# Patient Record
Sex: FEMALE | NOT HISPANIC OR LATINO | URBAN - METROPOLITAN AREA
[De-identification: names, ages, dates, MRNs, and addresses within clinical notes are randomized per-mention and may not be internally consistent; named-entity substitution may affect disease eponyms.]

---

## 2020-04-27 ENCOUNTER — INPATIENT (INPATIENT)
Facility: HOSPITAL | Age: 60
LOS: 2 days | Discharge: HOME HEALTH | End: 2020-04-30
Attending: HOSPITALIST | Admitting: HOSPITALIST
Payer: COMMERCIAL

## 2020-04-27 VITALS
OXYGEN SATURATION: 95 % | DIASTOLIC BLOOD PRESSURE: 56 MMHG | RESPIRATION RATE: 22 BRPM | SYSTOLIC BLOOD PRESSURE: 122 MMHG | TEMPERATURE: 98 F | HEART RATE: 89 BPM

## 2020-04-27 DIAGNOSIS — E87.6 HYPOKALEMIA: ICD-10-CM

## 2020-04-27 DIAGNOSIS — Z98.891 HISTORY OF UTERINE SCAR FROM PREVIOUS SURGERY: Chronic | ICD-10-CM

## 2020-04-27 DIAGNOSIS — Z29.9 ENCOUNTER FOR PROPHYLACTIC MEASURES, UNSPECIFIED: ICD-10-CM

## 2020-04-27 DIAGNOSIS — R68.89 OTHER GENERAL SYMPTOMS AND SIGNS: ICD-10-CM

## 2020-04-27 DIAGNOSIS — R74.0 NONSPECIFIC ELEVATION OF LEVELS OF TRANSAMINASE AND LACTIC ACID DEHYDROGENASE [LDH]: ICD-10-CM

## 2020-04-27 DIAGNOSIS — Z02.9 ENCOUNTER FOR ADMINISTRATIVE EXAMINATIONS, UNSPECIFIED: ICD-10-CM

## 2020-04-27 DIAGNOSIS — J18.9 PNEUMONIA, UNSPECIFIED ORGANISM: ICD-10-CM

## 2020-04-27 DIAGNOSIS — J96.91 RESPIRATORY FAILURE, UNSPECIFIED WITH HYPOXIA: ICD-10-CM

## 2020-04-27 LAB
ALBUMIN SERPL ELPH-MCNC: 3.5 G/DL — SIGNIFICANT CHANGE UP (ref 3.3–5)
ALP SERPL-CCNC: 59 U/L — SIGNIFICANT CHANGE UP (ref 40–120)
ALT FLD-CCNC: 43 U/L — HIGH (ref 4–33)
ANION GAP SERPL CALC-SCNC: 16 MMO/L — HIGH (ref 7–14)
ANISOCYTOSIS BLD QL: SLIGHT — SIGNIFICANT CHANGE UP
APTT BLD: 29.8 SEC — SIGNIFICANT CHANGE UP (ref 27.5–36.3)
AST SERPL-CCNC: 40 U/L — HIGH (ref 4–32)
BASE EXCESS BLDV CALC-SCNC: 2.8 MMOL/L — SIGNIFICANT CHANGE UP
BASOPHILS # BLD AUTO: 0.01 K/UL — SIGNIFICANT CHANGE UP (ref 0–0.2)
BASOPHILS NFR BLD AUTO: 0.2 % — SIGNIFICANT CHANGE UP (ref 0–2)
BASOPHILS NFR SPEC: 0 % — SIGNIFICANT CHANGE UP (ref 0–2)
BILIRUB SERPL-MCNC: 0.7 MG/DL — SIGNIFICANT CHANGE UP (ref 0.2–1.2)
BLASTS # FLD: 0 % — SIGNIFICANT CHANGE UP (ref 0–0)
BLOOD GAS VENOUS - CREATININE: SIGNIFICANT CHANGE UP MG/DL (ref 0.5–1.3)
BUN SERPL-MCNC: 9 MG/DL — SIGNIFICANT CHANGE UP (ref 7–23)
CALCIUM SERPL-MCNC: 9.1 MG/DL — SIGNIFICANT CHANGE UP (ref 8.4–10.5)
CHLORIDE BLDV-SCNC: 102 MMOL/L — SIGNIFICANT CHANGE UP (ref 96–108)
CHLORIDE SERPL-SCNC: 98 MMOL/L — SIGNIFICANT CHANGE UP (ref 98–107)
CO2 SERPL-SCNC: 23 MMOL/L — SIGNIFICANT CHANGE UP (ref 22–31)
CREAT SERPL-MCNC: 0.49 MG/DL — LOW (ref 0.5–1.3)
CRP SERPL-MCNC: 112.8 MG/L — HIGH
D DIMER BLD IA.RAPID-MCNC: 279 NG/ML — SIGNIFICANT CHANGE UP
EOSINOPHIL # BLD AUTO: 0.01 K/UL — SIGNIFICANT CHANGE UP (ref 0–0.5)
EOSINOPHIL NFR BLD AUTO: 0.2 % — SIGNIFICANT CHANGE UP (ref 0–6)
EOSINOPHIL NFR FLD: 0 % — SIGNIFICANT CHANGE UP (ref 0–6)
FERRITIN SERPL-MCNC: 1204 NG/ML — HIGH (ref 15–150)
GAS PNL BLDV: 135 MMOL/L — LOW (ref 136–146)
GIANT PLATELETS BLD QL SMEAR: PRESENT — SIGNIFICANT CHANGE UP
GLUCOSE BLDV-MCNC: 114 MG/DL — HIGH (ref 70–99)
GLUCOSE SERPL-MCNC: 109 MG/DL — HIGH (ref 70–99)
HCO3 BLDV-SCNC: 26 MMOL/L — SIGNIFICANT CHANGE UP (ref 20–27)
HCT VFR BLD CALC: 37.1 % — SIGNIFICANT CHANGE UP (ref 34.5–45)
HCT VFR BLDV CALC: 41.6 % — SIGNIFICANT CHANGE UP (ref 34.5–45)
HGB BLD-MCNC: 12.7 G/DL — SIGNIFICANT CHANGE UP (ref 11.5–15.5)
HGB BLDV-MCNC: 13.5 G/DL — SIGNIFICANT CHANGE UP (ref 11.5–15.5)
IMM GRANULOCYTES NFR BLD AUTO: 0.5 % — SIGNIFICANT CHANGE UP (ref 0–1.5)
INR BLD: 1.42 — HIGH (ref 0.88–1.17)
LACTATE BLDV-MCNC: 2 MMOL/L — SIGNIFICANT CHANGE UP (ref 0.5–2)
LYMPHOCYTES # BLD AUTO: 0.96 K/UL — LOW (ref 1–3.3)
LYMPHOCYTES # BLD AUTO: 14.9 % — SIGNIFICANT CHANGE UP (ref 13–44)
LYMPHOCYTES NFR SPEC AUTO: 7.8 % — LOW (ref 13–44)
MCHC RBC-ENTMCNC: 29.1 PG — SIGNIFICANT CHANGE UP (ref 27–34)
MCHC RBC-ENTMCNC: 34.2 % — SIGNIFICANT CHANGE UP (ref 32–36)
MCV RBC AUTO: 84.9 FL — SIGNIFICANT CHANGE UP (ref 80–100)
METAMYELOCYTES # FLD: 0 % — SIGNIFICANT CHANGE UP (ref 0–1)
MONOCYTES # BLD AUTO: 0.49 K/UL — SIGNIFICANT CHANGE UP (ref 0–0.9)
MONOCYTES NFR BLD AUTO: 7.6 % — SIGNIFICANT CHANGE UP (ref 2–14)
MONOCYTES NFR BLD: 6.9 % — SIGNIFICANT CHANGE UP (ref 2–9)
MYELOCYTES NFR BLD: 0 % — SIGNIFICANT CHANGE UP (ref 0–0)
NEUTROPHIL AB SER-ACNC: 79.3 % — HIGH (ref 43–77)
NEUTROPHILS # BLD AUTO: 4.94 K/UL — SIGNIFICANT CHANGE UP (ref 1.8–7.4)
NEUTROPHILS NFR BLD AUTO: 76.6 % — SIGNIFICANT CHANGE UP (ref 43–77)
NEUTS BAND # BLD: 0 % — SIGNIFICANT CHANGE UP (ref 0–6)
NRBC # FLD: 0 K/UL — SIGNIFICANT CHANGE UP (ref 0–0)
OTHER - HEMATOLOGY %: 0 — SIGNIFICANT CHANGE UP
PCO2 BLDV: 38 MMHG — LOW (ref 41–51)
PH BLDV: 7.45 PH — HIGH (ref 7.32–7.43)
PLATELET # BLD AUTO: 376 K/UL — SIGNIFICANT CHANGE UP (ref 150–400)
PLATELET COUNT - ESTIMATE: NORMAL — SIGNIFICANT CHANGE UP
PMV BLD: 9.4 FL — SIGNIFICANT CHANGE UP (ref 7–13)
PO2 BLDV: 32 MMHG — LOW (ref 35–40)
POLYCHROMASIA BLD QL SMEAR: SLIGHT — SIGNIFICANT CHANGE UP
POTASSIUM BLDV-SCNC: 2.9 MMOL/L — CRITICAL LOW (ref 3.4–4.5)
POTASSIUM SERPL-MCNC: 3 MMOL/L — LOW (ref 3.5–5.3)
POTASSIUM SERPL-SCNC: 3 MMOL/L — LOW (ref 3.5–5.3)
PROCALCITONIN SERPL-MCNC: 0.1 NG/ML — SIGNIFICANT CHANGE UP (ref 0.02–0.1)
PROMYELOCYTES # FLD: 0 % — SIGNIFICANT CHANGE UP (ref 0–0)
PROT SERPL-MCNC: 7.6 G/DL — SIGNIFICANT CHANGE UP (ref 6–8.3)
PROTHROM AB SERPL-ACNC: 16.4 SEC — HIGH (ref 9.8–13.1)
RBC # BLD: 4.37 M/UL — SIGNIFICANT CHANGE UP (ref 3.8–5.2)
RBC # FLD: 12.7 % — SIGNIFICANT CHANGE UP (ref 10.3–14.5)
REVIEW TO FOLLOW: YES — SIGNIFICANT CHANGE UP
SAO2 % BLDV: 59.8 % — LOW (ref 60–85)
SARS-COV-2 RNA SPEC QL NAA+PROBE: DETECTED
SODIUM SERPL-SCNC: 137 MMOL/L — SIGNIFICANT CHANGE UP (ref 135–145)
VARIANT LYMPHS # BLD: 6 % — SIGNIFICANT CHANGE UP
WBC # BLD: 6.44 K/UL — SIGNIFICANT CHANGE UP (ref 3.8–10.5)
WBC # FLD AUTO: 6.44 K/UL — SIGNIFICANT CHANGE UP (ref 3.8–10.5)

## 2020-04-27 PROCEDURE — 71045 X-RAY EXAM CHEST 1 VIEW: CPT | Mod: 26

## 2020-04-27 PROCEDURE — 99232 SBSQ HOSP IP/OBS MODERATE 35: CPT | Mod: GC

## 2020-04-27 RX ORDER — LANOLIN ALCOHOL/MO/W.PET/CERES
3 CREAM (GRAM) TOPICAL AT BEDTIME
Refills: 0 | Status: DISCONTINUED | OUTPATIENT
Start: 2020-04-27 | End: 2020-04-30

## 2020-04-27 RX ORDER — POTASSIUM CHLORIDE 20 MEQ
40 PACKET (EA) ORAL ONCE
Refills: 0 | Status: COMPLETED | OUTPATIENT
Start: 2020-04-27 | End: 2020-04-27

## 2020-04-27 RX ORDER — ENOXAPARIN SODIUM 100 MG/ML
40 INJECTION SUBCUTANEOUS DAILY
Refills: 0 | Status: DISCONTINUED | OUTPATIENT
Start: 2020-04-27 | End: 2020-04-30

## 2020-04-27 RX ORDER — ALBUTEROL 90 UG/1
2 AEROSOL, METERED ORAL EVERY 6 HOURS
Refills: 0 | Status: DISCONTINUED | OUTPATIENT
Start: 2020-04-27 | End: 2020-04-30

## 2020-04-27 RX ORDER — ACETAMINOPHEN 500 MG
650 TABLET ORAL EVERY 6 HOURS
Refills: 0 | Status: DISCONTINUED | OUTPATIENT
Start: 2020-04-27 | End: 2020-04-30

## 2020-04-27 RX ORDER — POTASSIUM CHLORIDE 20 MEQ
20 PACKET (EA) ORAL ONCE
Refills: 0 | Status: COMPLETED | OUTPATIENT
Start: 2020-04-27 | End: 2020-04-27

## 2020-04-27 RX ADMIN — Medication 40 MILLIEQUIVALENT(S): at 18:58

## 2020-04-27 RX ADMIN — Medication 20 MILLIEQUIVALENT(S): at 21:17

## 2020-04-27 RX ADMIN — Medication 3 MILLIGRAM(S): at 22:05

## 2020-04-27 NOTE — ED ADULT NURSE NOTE - OBJECTIVE STATEMENT
60 year old female with no PHX, A&Ox4, ambulatory C/O fevers, cough, myalgias x 1 week, worsening SOB x few days. States  at home positive COVID 19. Denies N/V/D, CP. SpO2 93 % on room air at rest, ambulatory SpO2 88 % on room air. Md evaluated, VS as noted. 20 G IV placed to R AC, labs sent, COVID 19 swab sent. Call bell within reach, comfort measures provided, Repot given to primary area RN.

## 2020-04-27 NOTE — H&P ADULT - HISTORY OF PRESENT ILLNESS
Pt is a 61 yo F w/ no known PMHx presents with cough, SOB, and fever. Pt states she has had cough productive of white sputum and body aches x1 week. Pt checked SpO2 at home and saw it was 80% so she decided to come to the ED. Pt was swabbed for COVID 2 days ago. She denies CP, n/v, abdominal pain, diarrhea.     In ED:  VS: Temp 36.9, HR 89, /56 --> 95/48, RR 22-28, SpO2 95% on RA  Labs: auto lymphocyte # 0.96, K 3, VBG 7.45/38/32/26  Imaging: CXR R sided peripheral opacities  Interventions: K 40 meq Pt is a 61 yo F w/ no known PMHx presents with SOB and fever. Pt states that over a week ago she developed a generalized HA, dry throat, fatigue, and chills. This morning, she noticed she had worsening SOB and MORRISON. She checked her SpO2 on her pulse oximeter at home and saw it was 80% so she decided to come to the ED. A week ago, pt was prescribed azithromycin by her doctor and completed a course without improvement in symptoms. She had a CXR performed on Friday 4/24 which showed PNA and she was started on levaquin, again without improvement in sx. Pt was swabbed for COVID 2 days ago but the results are not back yet. Her  and son who live with her are COVID +. 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 She denies CP, n/v, abdominal pain, diarrhea.     In ED:  VS: Temp 36.9, HR 89, /56 --> 95/48, RR 22-28, SpO2 95% on RA. Per ED note, pt desaturated w/ ambulation   Labs: auto lymphocyte # 0.96, K 3, AST 40/ALT 43, VBG 7.45/38/32/26, ferritin 1204, .8, procal, d-dimer wnl             EKG: NSR;   Imaging: CXR R sided peripheral opacities  Interventions: K 40 meq Pt is a 61 yo F w/ no known PMHx presents with SOB and fever. Pt states that over a week ago she developed a generalized HA, dry throat, fatigue, and chills. This morning, she noticed she had worsening SOB and MORRISON. She checked her SpO2 on her pulse oximeter at home and saw it was 80% so she decided to come to the ED. A week ago, pt was prescribed azithromycin by her doctor and completed a course without improvement in symptoms. She had a CXR performed on Friday 4/24 which showed PNA and she was started on levaquin, again without improvement in sx. Pt was swabbed for COVID 2 days ago but the results are not back yet. Her  and son who live with her are COVID +. 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 She denies CP, n/v, abdominal pain, diarrhea.     In ED:  VS: Temp 36.9, HR 89, /56 --> 95/48, RR 22-28, SpO2 95% on RA. Per ED note, pt desaturated w/ ambulation   Labs: auto lymphocyte # 0.96, K 3, AST 40/ALT 43, VBG 7.45/38/32/26, ferritin 1204, .8, procal, d-dimer wnl. INR 1.4             EKG: NSR   Imaging: CXR R sided peripheral opacities  Interventions: K 40 meq

## 2020-04-27 NOTE — H&P ADULT - PROBLEM SELECTOR PLAN 1
CXR w/ b/l opacities c/w COVID PNA. Pt w/ known contacts.  - f/u COVID-PCR  - supplemental O2 for SpO2>92%. Pt currently off O2  - f/u w/ ID regarding qualification for clinical trials pending COVID results CXR w/ b/l opacities c/w COVID PNA. Pt w/ known contacts.  - f/u COVID-PCR, sent in ED  - supplemental O2 for SpO2>92%. Pt currently off O2 w/ SpO2 95% on RA and appear comfortable. Will check ambulatory SpO2  - f/u w/ ID regarding qualification for clinical trials pending COVID results  - Tylenol prn for fever CXR w/ b/l opacities c/w COVID PNA. Pt w/ known + COVID contacts.  - f/u COVID-PCR  - supplemental O2 for SpO2>92%. Pt currently off O2 w/ SpO2 95% on RA and appear comfortable. Will check ambulatory SpO2  - f/u w/ ID regarding qualification for clinical trials pending COVID results  - Tylenol prn for fever Pt w/ hypoxia in setting of likely COVID-19 PNA.   - supplemental O2 for SpO2>92%. Pt currently off O2 w/ SpO2 95% on RA and appear comfortable. Will check ambulatory SpO2  - albuterol HFA prn for SOB  - management of PNA as below

## 2020-04-27 NOTE — ED PROVIDER NOTE - CLINICAL SUMMARY MEDICAL DECISION MAKING FREE TEXT BOX
59yo F no PMH presenting with complaints fever, cough and SOB x 1week. home pulse ox showed saturation of 80% this AM. 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 saturating 91-93% on RA at rest but desaturates with exertion. will obtain CXR, reswab, labs and likely admission. Delvin Anand DO: 59 yo F no PMH presenting with complaints fever, cough and SOB x 1week. home pulse ox showed saturation of 80% this AM. 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 SP02 91-93% on RA at rest, + desaturates with exertion. plan: labs, cxr, symptom relief prn, admit.

## 2020-04-27 NOTE — H&P ADULT - ASSESSMENT
Pt is a 59 yo F w/ no known PMHx presents with SOB and fever in setting of known COVID contacts. In ED, pt found to desaturate with ambulation. CXR shows b/l PNA  consistent with covid-19 infection. Pt is a 59 yo F w/ no known PMHx presents with SOB and fever in setting of known COVID contacts. In ED, pt found to desaturate with ambulation. CXR shows b/l PNA consistent with covid-19 infection.

## 2020-04-27 NOTE — H&P ADULT - PROBLEM SELECTOR PLAN 2
K 3.0 on admission. S/p 40 meq in ED, will give additional 20 meq.  - monitor BMP for K K 3.0 on admission. S/p 40 meq in ED, will give additional 20 meq po.  - monitor BMP for K CXR w/ b/l opacities c/w COVID PNA. Pt w/ known + COVID contacts.  - f/u COVID-PCR  - supplemental O2 for SpO2>92%. Pt currently off O2 w/ SpO2 95% on RA and appear comfortable. Will check ambulatory SpO2  - f/u w/ ID regarding qualification for clinical trials pending COVID results  - Tylenol prn for fever  - albuterol HFA prn for SOB CXR w/ b/l opacities c/w COVID PNA. Pt w/ known + COVID contacts.  - COVID PCR (+)  - supplemental O2 as needed for SpO2 goal 92-96%. Pt currently off O2 w/ SpO2 95% on RA and appear comfortable. Will check ambulatory SpO2  - f/u w/ ID regarding qualification for clinical trials in AM  - Tylenol prn for fever  - albuterol HFA prn for SOB

## 2020-04-27 NOTE — H&P ADULT - PROBLEM SELECTOR PLAN 5
Transitions of Care Status:  1.  Name of PCP: Dr. Gabriel Mayorga  2.  PCP Contacted on Admission: [ ] Y    [x] N - admitted overnight   3.  PCP contacted at Discharge: [ ] Y    [ ] N    [ ] N/A  4.  Post-Discharge Appointment Date and Location:  5.  Summary of Handoff given to PCP: DVT ppx: lovenox 40 mg qd. d-dimer on admission wnl  Diet: regular

## 2020-04-27 NOTE — H&P ADULT - ATTENDING COMMENTS
Agree with resident plan as edited above.  I did not have direct patient contact as currently recommended by Hospital Medicine emergency protocols, in an effort to reduce COVID exposures and conserve PPE for necessary encounters.

## 2020-04-27 NOTE — ED PROVIDER NOTE - CARE PLAN
Principal Discharge DX:	Suspected 2019-nCoV infection Principal Discharge DX:	Suspected 2019-nCoV infection  Secondary Diagnosis:	Hypoxia

## 2020-04-27 NOTE — H&P ADULT - PROBLEM SELECTOR PLAN 6
Transitions of Care Status:  1.  Name of PCP: Dr. Gabriel Mayorga  2.  PCP Contacted on Admission: [ ] Y    [x] N - admitted overnight   3.  PCP contacted at Discharge: [ ] Y    [ ] N    [ ] N/A  4.  Post-Discharge Appointment Date and Location:  5.  Summary of Handoff given to PCP:

## 2020-04-27 NOTE — H&P ADULT - NSHPREVIEWOFSYSTEMS_GEN_ALL_CORE
CONSTITUTIONAL: No weakness, fatigue, malaise, fevers or chills, no weight change, appetite change  EYES: No visual changes; No double vision,  No vertigo, eye pain  Ears: no otalgia, no otorrhea, no hearing loss, tinnitus  Nose: no epistaxis, rhinorrhea, post-discharge, sinus pressure  Throat: no throat pain, no oral lesions, tooth pain   NECK: No pain or stiffness  RESPIRATORY: No cough (productive or dry), wheezing, hemoptysis; No shortness of breath, orthopnea, PND, MORRISON   CARDIOVASCULAR: No chest pain or palpitations, no leg edema, no claudication    GASTROINTESTINAL: No abdominal or epigastric pain. No nausea, vomiting, or hematemesis; No diarrhea or constipation. No melena or hematochezia.  GENITOURINARY: No dysuria, frequency, urgency or hematuria, no pelvic pain, urinary incontinence, urgency  MSK: no joints or muscle pain, no swelling in joints or muscles  NEUROLOGICAL: No numbness or weakness, headache, memory loss, seizures, dizziness, vertigo, syncope, ataxia  SKIN: No pruritis, rashes, lesions or new moles  Psych: No anxiety, sadness, insomnia, suicide thoughts  Endocrine: No Heat or Cold intolerance, polydipsia, polyphagia  Heme/Lymph: no LN enlargement, no easy bruising or bleeding CONSTITUTIONAL: + weakness, fatigue, fevers/chills, reduced appetite  Nose: no rhinorrhea  Throat: +dry throat   RESPIRATORY: No cough (productive or dry). +SOB/MORRISON  CARDIOVASCULAR: No chest pain or palpitations   GASTROINTESTINAL: No abdominal. No nausea, vomiting; No diarrhea or constipation   GENITOURINARY: No dysuria, frequency, urgency  MSK: no muscle pain  NEUROLOGICAL: no headache   SKIN: No rashes   Heme/Lymph: no easy bruising or bleeding CONSTITUTIONAL: + weakness, fatigue, fevers/chills, reduced appetite  ENT: no rhinorrhea; + dry throat  RESPIRATORY: No cough (productive or dry). +SOB/MORRISON  CARDIOVASCULAR: No chest pain or palpitations   GASTROINTESTINAL: No abdominal. No nausea, vomiting; No diarrhea or constipation   GENITOURINARY: No dysuria, frequency, urgency  MSK: no muscle pain; no LE edema  NEUROLOGICAL: no headache   SKIN: No rashes/skin changes  Heme/Lymph: no easy bruising or bleeding

## 2020-04-27 NOTE — ED PROVIDER NOTE - RESPIRATORY, MLM
Breath sounds clear and equal bilaterally. speaking in full sentences, nonlabored breathing. O2 saturation 91-93 at rest on RA. desaturation to 89% with mild exertion.

## 2020-04-27 NOTE — H&P ADULT - NSHPPHYSICALEXAM_GEN_ALL_CORE
ED exam:  · CONSTITUTIONAL: Well appearing, awake, alert, oriented to person, place, time/situation and in no apparent distress.  · CARDIAC: Normal rate, regular rhythm.  Heart sounds S1, S2.  No murmurs, rubs or gallops. no chest wall ttp.  · RESPIRATORY: Breath sounds clear and equal bilaterally. speaking in full sentences, nonlabored breathing. O2 saturation 91-93 at rest on RA. desaturation to 89% with mild exertion.  · GASTROINTESTINAL: Abdomen soft, non-tender, no guarding.  · NEUROLOGICAL: Alert and oriented, no focal deficits, no motor or sensory deficits.  · SKIN: Skin normal color for race, warm, dry and intact. No evidence of rash.

## 2020-04-27 NOTE — H&P ADULT - NSHPLABSRESULTS_GEN_ALL_CORE
12.7   6.44  )-----------( 376      ( 27 Apr 2020 14:20 )             37.1     04-27    137  |  98  |  9   ----------------------------<  109<H>  3.0<L>   |  23  |  0.49<L>    Ca    9.1      27 Apr 2020 14:20    TPro  7.6  /  Alb  3.5  /  TBili  0.7  /  DBili  x   /  AST  40<H>  /  ALT  43<H>  /  AlkPhos  59  04-27    PT/INR - ( 27 Apr 2020 14:20 )   PT: 16.4 SEC;   INR: 1.42          PTT - ( 27 Apr 2020 14:20 )  PTT:29.8 SEC    RADIOLOGY, EKG & ADDITIONAL TESTS: Reviewed. 12.7   6.44  )-----------( 376      ( 27 Apr 2020 14:20 )             37.1     04-27    137  |  98  |  9   ----------------------------<  109<H>  3.0<L>   |  23  |  0.49<L>    Ca    9.1      27 Apr 2020 14:20    TPro  7.6  /  Alb  3.5  /  TBili  0.7  /  DBili  x   /  AST  40<H>  /  ALT  43<H>  /  AlkPhos  59  04-27    PT/INR - ( 27 Apr 2020 14:20 )   PT: 16.4 SEC;   INR: 1.42     PTT - ( 27 Apr 2020 14:20 )  PTT:29.8 SEC  D-Dimer Assay, Quantitative: 279 ng/mL (04.27.20 @ 14:20)    Ferritin, Serum: 1204 ng/mL (04.27.20 @ 14:20)  Procalcitonin, Serum: 0.10 ng/mL (04.27.20 @ 14:20)  C-Reactive Protein, Serum: 112.8 mg/L (04.27.20 @ 14:20)    14:20 - VBG - pH: 7.45  | pCO2: 38    | pO2: 32    | Lactate: 2.0      COVID-19 PCR: Detected (04.27.20 @ 14:25)    < from: Xray Chest 1 View AP/PA (04.27.20 @ 14:51) >  Bilateral patchy opacities are present peripheral on the right side suspicious for covid 19 infection. Apices are spared. The heart is not enlarged. The bones are intact.   Impression: Right-sided peripheral opacities consistent with covid 19 infection.  DISCRETE X-RAY DATA:  Percent of LEFT lung opacification: 1-33%  Percent of RIGHT lung opacification: 34-66%  Change in lung opacification from most recent x-ray (<=3 days): No Prior  Change from prior dated 3 or more days (same admission): No Prior  < end of copied text >    RADIOLOGY, EKG & ADDITIONAL TESTS: Reviewed. 12.7   6.44  )-----------( 376      ( 27 Apr 2020 14:20 )             37.1     04-27    137  |  98  |  9   ----------------------------<  109<H>  3.0<L>   |  23  |  0.49<L>    Ca    9.1      27 Apr 2020 14:20    TPro  7.6  /  Alb  3.5  /  TBili  0.7  /  DBili  x   /  AST  40<H>  /  ALT  43<H>  /  AlkPhos  59  04-27    PT/INR - ( 27 Apr 2020 14:20 )   PT: 16.4 SEC;   INR: 1.42     PTT - ( 27 Apr 2020 14:20 )  PTT:29.8 SEC  D-Dimer Assay, Quantitative: 279 ng/mL (04.27.20 @ 14:20)    Ferritin, Serum: 1204 ng/mL (04.27.20 @ 14:20)  Procalcitonin, Serum: 0.10 ng/mL (04.27.20 @ 14:20)  C-Reactive Protein, Serum: 112.8 mg/L (04.27.20 @ 14:20)    14:20 - VBG - pH: 7.45  | pCO2: 38    | pO2: 32    | Lactate: 2.0      COVID-19 PCR: Detected (04.27.20 @ 14:25)    < from: Xray Chest 1 View AP/PA (04.27.20 @ 14:51) >  Bilateral patchy opacities are present peripheral on the right side suspicious for covid 19 infection. Apices are spared. The heart is not enlarged. The bones are intact.   Impression: Right-sided peripheral opacities consistent with covid 19 infection.  DISCRETE X-RAY DATA:  Percent of LEFT lung opacification: 1-33%  Percent of RIGHT lung opacification: 34-66%  Change in lung opacification from most recent x-ray (<=3 days): No Prior  Change from prior dated 3 or more days (same admission): No Prior  < end of copied text >    EKG personally reviewed - 69bpm NSR; QTc 456ms

## 2020-04-27 NOTE — ED PROVIDER NOTE - OBJECTIVE STATEMENT
59yo F no PMH presenting with complaints of  cough and SOB. Pt reports fever tmax 102, cough productive of white sputum and body aches x 1 week. SOB worse with exertion. 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 was swabbed at outside facility 2 days ago still pending results. woke up this morning in a cold sweat and her O2 sat was 80%. denies any chest pain, N/V/D, leg swelling, abd pain, sore throat. never smoker.

## 2020-04-27 NOTE — H&P ADULT - PROBLEM SELECTOR PLAN 3
DVT ppx:  Diet: regular DVT ppx: lovenox 40 mg qd. d-dimer on admission wnl  Diet: regular Mild transaminitis in setting of COVID.   - will trend Mild transaminitis in setting of likely COVID-19 infection.   - will trend K 3.0 on admission. S/p 40 meq in ED, will give additional 20 meq po.  - monitor BMP for K

## 2020-04-27 NOTE — ED ADULT NURSE NOTE - NSIMPLEMENTINTERV_GEN_ALL_ED
Implemented All Universal Safety Interventions:  [place] to call system. Call bell, personal items and telephone within reach. Instruct patient to call for assistance. Room bathroom lighting operational. Non-slip footwear when patient is off stretcher. Physically safe environment: no spills, clutter or unnecessary equipment. Stretcher in lowest position, wheels locked, appropriate side rails in place.

## 2020-04-27 NOTE — H&P ADULT - PROBLEM SELECTOR PLAN 4
Transitions of Care Status:  1.  Name of PCP:  2.  PCP Contacted on Admission: [ ] Y    [ ] N    3.  PCP contacted at Discharge: [ ] Y    [ ] N    [ ] N/A  4.  Post-Discharge Appointment Date and Location:  5.  Summary of Handoff given to PCP: Transitions of Care Status:  1.  Name of PCP: Dr. Gabriel Mayorga  2.  PCP Contacted on Admission: [ ] Y    [x] N - admitted overnight   3.  PCP contacted at Discharge: [ ] Y    [ ] N    [ ] N/A  4.  Post-Discharge Appointment Date and Location:  5.  Summary of Handoff given to PCP: DVT ppx: lovenox 40 mg qd. d-dimer on admission wnl  Diet: regular Mild transaminitis in setting of likely COVID-19 infection.   - will trend Mild transaminitis in setting of COVID-19 infection.   - will trend

## 2020-04-28 ENCOUNTER — TRANSCRIPTION ENCOUNTER (OUTPATIENT)
Age: 60
End: 2020-04-28

## 2020-04-28 DIAGNOSIS — U07.1 COVID-19: ICD-10-CM

## 2020-04-28 LAB
ALBUMIN SERPL ELPH-MCNC: 3.1 G/DL — LOW (ref 3.3–5)
ALP SERPL-CCNC: 56 U/L — SIGNIFICANT CHANGE UP (ref 40–120)
ALT FLD-CCNC: 39 U/L — HIGH (ref 4–33)
ANION GAP SERPL CALC-SCNC: 10 MMO/L — SIGNIFICANT CHANGE UP (ref 7–14)
APTT BLD: 27.5 SEC — SIGNIFICANT CHANGE UP (ref 27.5–36.3)
AST SERPL-CCNC: 39 U/L — HIGH (ref 4–32)
BASOPHILS # BLD AUTO: 0.01 K/UL — SIGNIFICANT CHANGE UP (ref 0–0.2)
BASOPHILS NFR BLD AUTO: 0.2 % — SIGNIFICANT CHANGE UP (ref 0–2)
BILIRUB SERPL-MCNC: 0.6 MG/DL — SIGNIFICANT CHANGE UP (ref 0.2–1.2)
BUN SERPL-MCNC: 10 MG/DL — SIGNIFICANT CHANGE UP (ref 7–23)
CALCIUM SERPL-MCNC: 8.8 MG/DL — SIGNIFICANT CHANGE UP (ref 8.4–10.5)
CHLORIDE SERPL-SCNC: 105 MMOL/L — SIGNIFICANT CHANGE UP (ref 98–107)
CO2 SERPL-SCNC: 23 MMOL/L — SIGNIFICANT CHANGE UP (ref 22–31)
CREAT SERPL-MCNC: 0.47 MG/DL — LOW (ref 0.5–1.3)
EOSINOPHIL # BLD AUTO: 0.03 K/UL — SIGNIFICANT CHANGE UP (ref 0–0.5)
EOSINOPHIL NFR BLD AUTO: 0.7 % — SIGNIFICANT CHANGE UP (ref 0–6)
GLUCOSE SERPL-MCNC: 93 MG/DL — SIGNIFICANT CHANGE UP (ref 70–99)
HCT VFR BLD CALC: 34.1 % — LOW (ref 34.5–45)
HCV AB S/CO SERPL IA: 0.26 S/CO — SIGNIFICANT CHANGE UP (ref 0–0.99)
HCV AB SERPL-IMP: SIGNIFICANT CHANGE UP
HGB BLD-MCNC: 11.5 G/DL — SIGNIFICANT CHANGE UP (ref 11.5–15.5)
IMM GRANULOCYTES NFR BLD AUTO: 0.4 % — SIGNIFICANT CHANGE UP (ref 0–1.5)
INR BLD: 1.25 — HIGH (ref 0.88–1.17)
LYMPHOCYTES # BLD AUTO: 1.36 K/UL — SIGNIFICANT CHANGE UP (ref 1–3.3)
LYMPHOCYTES # BLD AUTO: 30 % — SIGNIFICANT CHANGE UP (ref 13–44)
MAGNESIUM SERPL-MCNC: 2.2 MG/DL — SIGNIFICANT CHANGE UP (ref 1.6–2.6)
MCHC RBC-ENTMCNC: 29.3 PG — SIGNIFICANT CHANGE UP (ref 27–34)
MCHC RBC-ENTMCNC: 33.7 % — SIGNIFICANT CHANGE UP (ref 32–36)
MCV RBC AUTO: 86.8 FL — SIGNIFICANT CHANGE UP (ref 80–100)
MONOCYTES # BLD AUTO: 0.4 K/UL — SIGNIFICANT CHANGE UP (ref 0–0.9)
MONOCYTES NFR BLD AUTO: 8.8 % — SIGNIFICANT CHANGE UP (ref 2–14)
NEUTROPHILS # BLD AUTO: 2.72 K/UL — SIGNIFICANT CHANGE UP (ref 1.8–7.4)
NEUTROPHILS NFR BLD AUTO: 59.9 % — SIGNIFICANT CHANGE UP (ref 43–77)
NRBC # FLD: 0 K/UL — SIGNIFICANT CHANGE UP (ref 0–0)
PHOSPHATE SERPL-MCNC: 3.2 MG/DL — SIGNIFICANT CHANGE UP (ref 2.5–4.5)
PLATELET # BLD AUTO: 397 K/UL — SIGNIFICANT CHANGE UP (ref 150–400)
PMV BLD: 9.6 FL — SIGNIFICANT CHANGE UP (ref 7–13)
POTASSIUM SERPL-MCNC: 3.7 MMOL/L — SIGNIFICANT CHANGE UP (ref 3.5–5.3)
POTASSIUM SERPL-SCNC: 3.7 MMOL/L — SIGNIFICANT CHANGE UP (ref 3.5–5.3)
PROT SERPL-MCNC: 6.9 G/DL — SIGNIFICANT CHANGE UP (ref 6–8.3)
PROTHROM AB SERPL-ACNC: 14.4 SEC — HIGH (ref 9.8–13.1)
RBC # BLD: 3.93 M/UL — SIGNIFICANT CHANGE UP (ref 3.8–5.2)
RBC # FLD: 12.6 % — SIGNIFICANT CHANGE UP (ref 10.3–14.5)
SODIUM SERPL-SCNC: 138 MMOL/L — SIGNIFICANT CHANGE UP (ref 135–145)
WBC # BLD: 4.54 K/UL — SIGNIFICANT CHANGE UP (ref 3.8–10.5)
WBC # FLD AUTO: 4.54 K/UL — SIGNIFICANT CHANGE UP (ref 3.8–10.5)

## 2020-04-28 PROCEDURE — 99223 1ST HOSP IP/OBS HIGH 75: CPT

## 2020-04-28 PROCEDURE — 99233 SBSQ HOSP IP/OBS HIGH 50: CPT

## 2020-04-28 RX ORDER — EPINEPHRINE 0.3 MG/.3ML
0.3 INJECTION INTRAMUSCULAR; SUBCUTANEOUS ONCE
Refills: 0 | Status: DISCONTINUED | OUTPATIENT
Start: 2020-04-28 | End: 2020-04-30

## 2020-04-28 RX ORDER — REMDESIVIR 5 MG/ML
100 INJECTION INTRAVENOUS DAILY
Refills: 0 | Status: DISCONTINUED | OUTPATIENT
Start: 2020-04-29 | End: 2020-04-30

## 2020-04-28 RX ORDER — DIPHENHYDRAMINE HCL 50 MG
25 CAPSULE ORAL ONCE
Refills: 0 | Status: DISCONTINUED | OUTPATIENT
Start: 2020-04-28 | End: 2020-04-30

## 2020-04-28 RX ORDER — REMDESIVIR 5 MG/ML
200 INJECTION INTRAVENOUS ONCE
Refills: 0 | Status: COMPLETED | OUTPATIENT
Start: 2020-04-28 | End: 2020-04-28

## 2020-04-28 RX ADMIN — REMDESIVIR 500 MILLIGRAM(S): 5 INJECTION INTRAVENOUS at 13:55

## 2020-04-28 RX ADMIN — ENOXAPARIN SODIUM 40 MILLIGRAM(S): 100 INJECTION SUBCUTANEOUS at 11:22

## 2020-04-28 NOTE — DISCHARGE NOTE PROVIDER - NSDCCPCAREPLAN_GEN_ALL_CORE_FT
PRINCIPAL DISCHARGE DIAGNOSIS  Diagnosis: Coronavirus infection  Assessment and Plan of Treatment: You have been diagnosed with the COVID-19 virus during your hospital stay. You must self quarantine to complete a 14 day time period.  Monitor for fevers, shortness of breath and cough primarily.  Monitor your temperature daily to not any changes and increases.    It has been determined that you no longer need hospitalization and can recover while remaining in self-quarantine at home. You should follow the prevention steps below until a healthcare provider or local or state health department says you can return to your normal activities.  1. You should restrict activities outside your home, except for getting medical care.  2. Do not go to work, school, or public areas.  3. Avoid using public transportation, ride-sharing, or taxis.  4. Separate yourself from other people and animals in your home.  5. Call ahead before visiting your doctor.  6. Wear a facemask.  7. Cover your coughs and sneezes.  8. Clean your hands often.  9. Avoid sharing personal household items.  10. Clean all “high-touch” surfaces everyday.  11. Monitor your symptoms.  If you have a medical emergency and need to call 911, notify the dispatch personnel that you have COVID-19 If possible, put on a facemask before emergency medical services arrive.  12. Stopping home isolation.  Patients with confirmed COVID-19 should remain under home isolation precautions for 14 days since the positive COVID-19 test and until the risk of secondary transmission to others is thought to be low. The decision to discontinue home isolation precautions should be made on a case-by-case basis, in consultation with healthcare providers and state and local health departments. Your [facility] Department of Health can be reached at [phone] for further information about COVID-19.        SECONDARY DISCHARGE DIAGNOSES  Diagnosis: Hypoxia  Assessment and Plan of Treatment: PRINCIPAL DISCHARGE DIAGNOSIS  Diagnosis: Pneumonia due to COVID-19 virus  Assessment and Plan of Treatment: Please follow up with your pcp within 1 week of discharge post quarantine.  Please call to make an appointment within 1 week of discharge.  You have been diagnosed with the COVID-19 virus during your hospital stay. You must self quarantine to complete a 14 day time period.  Monitor for fevers, shortness of breath and cough primarily.  Monitor your temperature daily to not any changes and increases.    It has been determined that you no longer need hospitalization and can recover while remaining in self-quarantine at home. You should follow the prevention steps below until a healthcare provider or local or state health department says you can return to your normal activities.  1. You should restrict activities outside your home, except for getting medical care.  2. Do not go to work, school, or public areas.  3. Avoid using public transportation, ride-sharing, or taxis.  4. Separate yourself from other people and animals in your home.  5. Call ahead before visiting your doctor.  6. Wear a facemask.  7. Cover your coughs and sneezes.  8. Clean your hands often.  9. Avoid sharing personal household items.  10. Clean all “high-touch” surfaces everyday.  11. Monitor your symptoms.  If you have a medical emergency and need to call 911, notify the dispatch personnel that you have COVID-19 If possible, put on a facemask before emergency medical services arrive.  12. Stopping home isolation.  Patients with confirmed COVID-19 should remain under home isolation precautions for 14 days since the positive COVID-19 test and until the risk of secondary transmission to others is thought to be low. The decision to discontinue home isolation precautions should be made on a case-by-case basis, in consultation with healthcare providers and state and local health departments. Your [facility] Department of Health can be reached at [phone] for further information about COVID-19.   -continue albuterol as needed      SECONDARY DISCHARGE DIAGNOSES  Diagnosis: Respiratory failure with hypoxia  Assessment and Plan of Treatment: Please follow up with your pcp within 1 week of discharge post quarantine.  Please call to make an appointment within 1 week of discharge.  Yor level were low secondary to hypoxia and you received oxygen ..You know longer require oxygen.  - continue albuterol inhaler as needed    Diagnosis: Transaminitis  Assessment and Plan of Treatment: Please follow up with your pcp within 1 week of discharge post quarantine.  Please call to make an appointment within 1 week of discharge. our liver function test were slightly elevated secondary to COVID-19.   Your liver function test are down trending.  Please you comprehensive metabolic panel within 1 week of discharge..    Diagnosis: Hypokalemia  Assessment and Plan of Treatment: Please follow up with your pcp within 1 week of discharge post quarantine.  Please call to make an appointment within 1 week of discharge. Your potassium level was low while you were in the hospital,  YOU received ptassium supplements and now your potassium is within normal limite PRINCIPAL DISCHARGE DIAGNOSIS  Diagnosis: Pneumonia due to COVID-19 virus  Assessment and Plan of Treatment: Please follow up with your PCP within 1 week of discharge post quarantine.  Please call to make an appointment within 1 week of discharge.  You have been diagnosed with the COVID-19 virus during your hospital stay. You must self quarantine to complete a 14 day time period.  Monitor for fevers, shortness of breath and cough primarily.  Monitor your temperature daily to not any changes and increases.    It has been determined that you no longer need hospitalization and can recover while remaining in self-quarantine at home. You should follow the prevention steps below until a healthcare provider or local or state health department says you can return to your normal activities.  1. You should restrict activities outside your home, except for getting medical care.  2. Do not go to work, school, or public areas.  3. Avoid using public transportation, ride-sharing, or taxis.  4. Separate yourself from other people and animals in your home.  5. Call ahead before visiting your doctor.  6. Wear a facemask.  7. Cover your coughs and sneezes.  8. Clean your hands often.  9. Avoid sharing personal household items.  10. Clean all “high-touch” surfaces everyday.  11. Monitor your symptoms.  If you have a medical emergency and need to call 911, notify the dispatch personnel that you have COVID-19 If possible, put on a facemask before emergency medical services arrive.  12. Stopping home isolation.  Patients with confirmed COVID-19 should remain under home isolation precautions for 14 days since the positive COVID-19 test and until the risk of secondary transmission to others is thought to be low. The decision to discontinue home isolation precautions should be made on a case-by-case basis, in consultation with healthcare providers and state and local health departments. Your [facility] Department of Health can be reached at [phone] for further information about COVID-19.

## 2020-04-28 NOTE — PROGRESS NOTE ADULT - SUBJECTIVE AND OBJECTIVE BOX
Medicine Progress Note    Patient is a 60y old  Female who presents with a chief complaint of PNA, r/o COVID (27 Apr 2020 19:10)      SUBJECTIVE / OVERNIGHT EVENTS:    ADDITIONAL REVIEW OF SYSTEMS:    MEDICATIONS  (STANDING):  enoxaparin Injectable 40 milliGRAM(s) SubCutaneous daily    MEDICATIONS  (PRN):  acetaminophen   Tablet .. 650 milliGRAM(s) Oral every 6 hours PRN Temp greater or equal to 38C (100.4F)  ALBUTerol    90 MICROgram(s) HFA Inhaler 2 Puff(s) Inhalation every 6 hours PRN Shortness of Breath  diphenhydrAMINE   Injectable 25 milliGRAM(s) IV Push once PRN anaphylaxis during remdesivir infusion  EPINEPHrine     1 mG/mL Injectable 0.3 milliGRAM(s) IntraMuscular once PRN anaphylaxis during remdesivir infusion  melatonin 3 milliGRAM(s) Oral at bedtime PRN Sleep    CAPILLARY BLOOD GLUCOSE        I&O's Summary    28 Apr 2020 07:01  -  28 Apr 2020 14:31  --------------------------------------------------------  IN: 474 mL / OUT: 0 mL / NET: 474 mL        PHYSICAL EXAM:  Vital Signs Last 24 Hrs  T(C): 36.9 (28 Apr 2020 13:55), Max: 36.9 (27 Apr 2020 18:56)  T(F): 98.4 (28 Apr 2020 13:55), Max: 98.5 (27 Apr 2020 18:56)  HR: 78 (28 Apr 2020 13:55) (69 - 87)  BP: 98/66 (28 Apr 2020 13:55) (92/56 - 113/52)  BP(mean): 63 (27 Apr 2020 18:56) (63 - 63)  RR: 20 (28 Apr 2020 13:55) (20 - 28)  SpO2: 95% (28 Apr 2020 13:55) (92% - 97%)  CONSTITUTIONAL: NAD, well-developed, well-groomed  ENMT: Moist oral mucosa, no pharyngeal injection or exudates; normal dentition  RESPIRATORY: Normal respiratory effort; lungs are clear to auscultation bilaterally  CARDIOVASCULAR: Regular rate and rhythm, normal S1 and S2, no murmur/rub/gallop; No lower extremity edema; Peripheral pulses are 2+ bilaterally  ABDOMEN: Nontender to palpation, normoactive bowel sounds, no rebound/guarding; No hepatosplenomegaly  PSYCH: A+O to person, place, and time; affect appropriate  NEUROLOGY: CN 2-12 are intact and symmetric; no gross sensory deficits   SKIN: No rashes; no palpable lesions    LABS:                        11.5   4.54  )-----------( 397      ( 28 Apr 2020 05:57 )             34.1     04-28    138  |  105  |  10  ----------------------------<  93  3.7   |  23  |  0.47<L>    Ca    8.8      28 Apr 2020 05:57  Phos  3.2     04-28  Mg     2.2     04-28    TPro  6.9  /  Alb  3.1<L>  /  TBili  0.6  /  DBili  x   /  AST  39<H>  /  ALT  39<H>  /  AlkPhos  56  04-28    PT/INR - ( 28 Apr 2020 06:00 )   PT: 14.4 SEC;   INR: 1.25          PTT - ( 28 Apr 2020 06:00 )  PTT:27.5 SEC          COVID-19 PCR: Detected (27 Apr 2020 14:25)      RADIOLOGY & ADDITIONAL TESTS:  Imaging from Last 24 Hours:    Electrocardiogram/QTc Interval:    COORDINATION OF CARE:  Care Discussed with Consultants/Other Providers:

## 2020-04-28 NOTE — PROVIDER CONTACT NOTE (OTHER) - ASSESSMENT
Patient BP low 98/65 auscultated, patient without symptoms, reports she usually runs low. All other vitals stable.

## 2020-04-28 NOTE — DISCHARGE NOTE PROVIDER - HOSPITAL COURSE
59 yo F w/ no known PMHx presents with SOB and fever in setting of known COVID contacts. In ED, pt found to desaturate with ambulation. CXR shows b/l PNA consistent with covid-19 infection.            Hospital Course: 59 yo F w/ no known PMHx presents with SOB and fever in setting of known COVID contacts. In ED, pt found to desaturate with ambulation. CXR shows b/l PNA consistent with covid-19 infection.            Hospital Course:        Respiratory failure with hypoxia.      Pt w/ hypoxia in setting of likely COVID-19 PNA.     - supplemental O2 for SpO2>92%. Pt currently off O2 w/ SpO2 95% on RA and appear comfortable. Will check ambulatory SpO2    - albuterol HFA prn for SOB    - management of PNA as below.         Pneumonia due to COVID-19 virus    : CXR w/ b/l opacities c/w COVID PNA. Pt w/ known + COVID contacts.    - COVID PCR (+)    - albuterol HFA prn for SOB    -enrolled in remdesivir trial.         Hypokalemia     K 3.0 on admission. S/p 40 meq in ED, will give additional 20 meq po.    - resolved.         Transaminitis.      Mild transaminitis in setting of COVID-19 infection.     - down trending        DVT ppx    - lovenox 40 mg qd. d-dimer on admission wnl            On_________, discussed with __________, patient is medically cleared and optimized for discharge today. All medications were reviewed with attending, and sent to mutually agreed upon pharmacy.     Dispo: HOme 60 F no PMHx p/w SOB and fever. COVID positive. CXR with B/L PNA c/w COVID. Pt treated with O2 PRN and Albuterol PRN with improvement. Pt also enrolled in Remdesivir trial in-hospital. On 4/30, discussed with Dr. Sepulveda, pt is medically cleared and optimized for discharge today. 60 F no PMHx p/w SOB and fever. COVID positive. CXR with B/L PNA c/w COVID. Pt treated with O2 PRN and Albuterol PRN with improvement. Pt also enrolled in Remdesivir trial in-hospital. Sating well on RA at rest and with ambulation.

## 2020-04-28 NOTE — PROGRESS NOTE ADULT - PROBLEM SELECTOR PLAN 2
CXR w/ b/l opacities c/w COVID PNA. Pt w/ known + COVID contacts.  - COVID PCR (+)  - supplemental O2 as needed for SpO2 goal 92-96%. Pt currently off O2 w/ SpO2 95% on RA and appear comfortable. Will check ambulatory SpO2  - f/u w/ ID regarding qualification for clinical trials in AM  - Tylenol prn for fever  - albuterol HFA prn for SOB  -enrolled in remdesivir trial

## 2020-04-28 NOTE — CONSULT NOTE ADULT - SUBJECTIVE AND OBJECTIVE BOX
pt seen and examined -note not saved due to computer glitch-rewritten   Patient is a 60y old  Female who presents with a chief complaint of PNA, r/o COVID (2020 15:02)    From HPI" HPI:  Pt is a 61 yo F w/ no known PMHx presents with SOB and fever. Pt states that over a week ago she developed a generalized HA, dry throat, fatigue, and chills. This morning, she noticed she had worsening SOB and MORRISON. She checked her SpO2 on her pulse oximeter at home and saw it was 80% so she decided to come to the ED. A week ago, pt was prescribed azithromycin by her doctor and completed a course without improvement in symptoms. She had a CXR performed on  which showed PNA and she was started on levaquin, again without improvement in sx. Pt was swabbed for COVID 2 days ago but the results are not back yet. Her  and son who live with her are COVID +. 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 She denies CP, n/v, abdominal pain, diarrhea.     In ED:  VS: Temp 36.9, HR 89, /56 --> 95/48, RR 22-28, SpO2 95% on RA. Per ED note, pt desaturated w/ ambulation   Labs: auto lymphocyte # 0.96, K 3, AST 40/ALT 43, VBG 7.45/38/32/26, ferritin 1204, .8, procal, d-dimer wnl. INR 1.4             EKG: NSR   Imaging: CXR R sided peripheral opacities  Interventions: K 40 meq (2020 19:10)  "    Above verified-agree with above unless noted below.    ID consulted     PAST MEDICAL & SURGICAL HISTORY:  No pertinent past medical history  H/O: : x3      Social history:  no   Smoking,    ETOH,      IVDU       FAMILY HISTORY:  No pertinent family history in first degree relatives  - Family history of medical problems in all first degree relatives reviewed.None of these were found to be related to patients current illness.    REVIEW OF SYSTEMS  General:	Denies any malaise fatigue or chills. Fevers+    Skin:No rash  	  Ophthalmologic:Denies any visual complaints,discharge redness or photophobia  	  ENMT:No nasal discharge,headache,sinus congestion or throat pain.No dental complaints    Respiratory and Thorax:+ cough,+sputum or chest pain.+shortness of breath  	  Cardiovascular:	No chest pain,palpitaions or dizziness    Gastrointestinal:	NO nausea,abdominal pain or diarrhea.    Genitourinary:	No dysuria,frequency. No flank pain    Musculoskeletal:	No joint swelling or pain.No weakness    Neurological:No confusion,diziness.No extremity weakness.No bladder or bowel incontinence	    Psychiatric:No delusions or hallucinations	    Hematology/Lymphatics:	No LN swelling.No gum bleeding     Endocrine:	No recent weight gain or loss.No abnormal heat/cold intolerance    Allergic/Immunologic:	No hives or rash   Allergies    Augmentin (Rash)    Intolerances        Antimicrobials:        Prior Antimicrobials:  MEDICATIONS  (STANDING):      Other medications reviewed  MEDICATIONS  (STANDING):  enoxaparin Injectable 40 milliGRAM(s) SubCutaneous daily          Vital Signs Last 24 Hrs  T(C): 37.2 (2020 04:37), Max: 37.2 (2020 20:40)  T(F): 99 (2020 04:37), Max: 99 (2020 04:37)  HR: 77 (2020 04:37) (77 - 85)  BP: 105/52 (2020 04:37) (96/57 - 105/58)  BP(mean): --  RR: 20 (2020 04:37) (20 - 22)  SpO2: 95% (2020 04:37) (92% - 96%)    PHYSICAL EXAM:Pleasant patient in no acute distress.      Constitutional:Comfortable.Awake and alert  No cachexia     Eyes:PERRL EOMI.NO discharge or conjunctival injection    ENMT:No sinus tenderness.No thrush.No pharyngeal exudate or erythema.Fair dental hygiene    Neck:Supple,No LN,no JVD      Respiratory:Good air entry bilaterally,minimal wheezes    Cardiovascular:S1 S2 wnl, No murmurs,rub or gallops    Gastrointestinal:Soft BS(+) no tenderness no masses ,No rebound or guarding    Genitourinary:No CVA tendereness     Rectal:    Extremities:No cyanosis,clubbing or edema.    Vascular:peripheral pulses felt    Neurological:AAO X 3,No grossly focal deficits    Skin:No rash     Lymph Nodes:No palpable LNs    Musculoskeletal:No joint swelling or LOM    Psychiatric:Affect normal.          Labs:                            11.5   4.54  )-----------( 397      ( 2020 05:57 )             34.1             138  |  105  |  10  ----------------------------<  93  3.7   |  23  |  0.47<L>    Ca    8.8      2020 05:57  Phos  3.2       Mg     2.2         TPro  6.9  /  Alb  3.1<L>  /  TBili  0.6  /  DBili  x   /  AST  39<H>  /  ALT  39<H>  /  AlkPhos  56            < from: Xray Chest 1 View AP/PA (20 @ 14:51) >  Impression:  1.  Right-sided peripheral opacities consistent with covid 19 infection.      DISCRETE X-RAY DATA:  Percent of LEFT lung opacification: 1-33%  Percent of RIGHT lung opacification: 34-66%  Change in lung opacification from most recent x-ray (<=3 days): No Prior  Change from prior dated 3 or more days (same admission): No Prior        < end of copied text >          Imaging studies(films) were independently reviewed.Findings as detailed in report above     COVID-19 PCR . (20 @ 14:25)    COVID-19 PCR: Detected

## 2020-04-28 NOTE — DISCHARGE NOTE PROVIDER - CARE PROVIDER_API CALL
PCP,   Phone: (   )    -  Fax: (   )    -  Follow Up Time: Dr. Gabriel Mayorga,   PCP  Phone: (   )    -  Fax: (   )    -  Follow Up Time: Barb Alexander (MD)  Internal Medicine  [address] [place] [address]  Phone: [phone]  Fax: [phone]  Follow Up Time:

## 2020-04-28 NOTE — DISCHARGE NOTE PROVIDER - PROVIDER TOKENS
FREE:[LAST:[PCP],PHONE:[(   )    -],FAX:[(   )    -]] FREE:[LAST:[Dr. Gabriel Mayorga],PHONE:[(   )    -],FAX:[(   )    -],ADDRESS:[PCP]] PROVIDER:[TOKEN:[[id number]]]

## 2020-04-28 NOTE — PROGRESS NOTE ADULT - ASSESSMENT
Pt is a 61 yo F w/ no known PMHx presents with SOB and fever in setting of known COVID contacts. In ED, pt found to desaturate with ambulation. CXR shows b/l PNA consistent with covid-19 infection.

## 2020-04-28 NOTE — CHART NOTE - NSCHARTNOTEFT_GEN_A_CORE
[facility]   Division of  Infectious Diseases      Protocol:  [employer] A phase 3 randomized study to evaluate the safety and antiviral activity of remdesivir (GS 5724) in participants with Moderate COVID-19 compared to standard of care treatment  Sponsor :[employer]      Date ICF Signed: ____as below__________________________  Subject ID: ___TBD _______________________________    Enrollment in the above listed Research study was discussed at length with P.I/ Investigator and Subject/LAR. Subject meets all the inclusion criterion and none of the exclusion criterion. Subject /LAR was given an opportunity to ask questions.  All subject’s/LAR’s questions were answered and all concerns were addressed. Informed Consent was obtained prior to performing any study procedures.Subject was also counseled to use adequate birth control methods required during the trial to avoid pregnancy. A copy of the signed Informed Consent was given to the subject/LAR.  Investigator’s and ’s contact information was also provided for 24/7 access. Subject was enrolled in the study.

## 2020-04-28 NOTE — PROGRESS NOTE ADULT - PROBLEM SELECTOR PLAN 1
Pt w/ hypoxia in setting of likely COVID-19 PNA.   Pt remains off O2 w/ SpO2 95% on RA and appears comfortable. Will check ambulatory SpO2  - albuterol HFA prn for SOB  - management of PNA as below

## 2020-04-29 ENCOUNTER — TRANSCRIPTION ENCOUNTER (OUTPATIENT)
Age: 60
End: 2020-04-29

## 2020-04-29 LAB
ALBUMIN SERPL ELPH-MCNC: 3.6 G/DL — SIGNIFICANT CHANGE UP (ref 3.3–5)
ALP SERPL-CCNC: 63 U/L — SIGNIFICANT CHANGE UP (ref 40–120)
ALT FLD-CCNC: 40 U/L — HIGH (ref 4–33)
ANION GAP SERPL CALC-SCNC: 13 MMO/L — SIGNIFICANT CHANGE UP (ref 7–14)
AST SERPL-CCNC: 31 U/L — SIGNIFICANT CHANGE UP (ref 4–32)
BILIRUB SERPL-MCNC: 0.9 MG/DL — SIGNIFICANT CHANGE UP (ref 0.2–1.2)
BUN SERPL-MCNC: 11 MG/DL — SIGNIFICANT CHANGE UP (ref 7–23)
CALCIUM SERPL-MCNC: 9.2 MG/DL — SIGNIFICANT CHANGE UP (ref 8.4–10.5)
CHLORIDE SERPL-SCNC: 105 MMOL/L — SIGNIFICANT CHANGE UP (ref 98–107)
CO2 SERPL-SCNC: 22 MMOL/L — SIGNIFICANT CHANGE UP (ref 22–31)
CREAT SERPL-MCNC: 0.43 MG/DL — LOW (ref 0.5–1.3)
GLUCOSE SERPL-MCNC: 123 MG/DL — HIGH (ref 70–99)
MAGNESIUM SERPL-MCNC: 2.2 MG/DL — SIGNIFICANT CHANGE UP (ref 1.6–2.6)
PHOSPHATE SERPL-MCNC: 3.3 MG/DL — SIGNIFICANT CHANGE UP (ref 2.5–4.5)
POTASSIUM SERPL-MCNC: 3.7 MMOL/L — SIGNIFICANT CHANGE UP (ref 3.5–5.3)
POTASSIUM SERPL-SCNC: 3.7 MMOL/L — SIGNIFICANT CHANGE UP (ref 3.5–5.3)
PROT SERPL-MCNC: 7.6 G/DL — SIGNIFICANT CHANGE UP (ref 6–8.3)
SODIUM SERPL-SCNC: 140 MMOL/L — SIGNIFICANT CHANGE UP (ref 135–145)

## 2020-04-29 PROCEDURE — 99232 SBSQ HOSP IP/OBS MODERATE 35: CPT

## 2020-04-29 PROCEDURE — 99233 SBSQ HOSP IP/OBS HIGH 50: CPT

## 2020-04-29 RX ADMIN — Medication 3 MILLIGRAM(S): at 22:20

## 2020-04-29 RX ADMIN — Medication 3 MILLIGRAM(S): at 00:01

## 2020-04-29 RX ADMIN — REMDESIVIR 500 MILLIGRAM(S): 5 INJECTION INTRAVENOUS at 12:35

## 2020-04-29 NOTE — DISCHARGE NOTE NURSING/CASE MANAGEMENT/SOCIAL WORK - PATIENT PORTAL LINK FT
You can access the FollowMyHealth Patient Portal offered by [facility] by registering at the following website: [email or web address]. By joining [employer]’s FollowMyHealth portal, you will also be able to view your health information using other applications (apps) compatible with our system.

## 2020-04-29 NOTE — PROGRESS NOTE ADULT - SUBJECTIVE AND OBJECTIVE BOX
Patient is a 60y old  Female who presents with a chief complaint of PNA, r/o COVID (28 Apr 2020 15:02)    Being followed by ID for covid    Interval history:feels well   tolerated remdesivir infusion  No acute events      ROS:  minimal cough,no SOB,CP  No N/V/D./abd pain  No other complaints      Antimicrobials:      Other medications reviewed  MEDICATIONS  (STANDING):  enoxaparin Injectable 40 milliGRAM(s) SubCutaneous daily  remdesivir IVPB ([id number]) 100 milliGRAM(s) IV Intermittent daily      Vital Signs Last 24 Hrs  T(C): 37.1 (04-29-20 @ 13:05), Max: 37.2 (04-28-20 @ 20:40)  T(F): 98.8 (04-29-20 @ 13:05), Max: 99 (04-29-20 @ 04:37)  HR: 88 (04-29-20 @ 13:05) (77 - 89)  BP: 108/60 (04-29-20 @ 13:05) (96/57 - 110/62)  BP(mean): --  RR: 18 (04-29-20 @ 13:05) (18 - 20)  SpO2: 94% (04-29-20 @ 13:05) (92% - 95%)    Physical Exam:        HEENT PERRLA EOMI    No oral exudate or erythema    Chest Good AE,CTA    CVS RRR S1 S2 WNl No murmur or rub or gallop    Abd soft BS normal No tenderness no masses    IV site no erythema tenderness or discharge    CNS AAO X 3 no focal    Lab Data:                          11.5   4.54  )-----------( 397      ( 28 Apr 2020 05:57 )             34.1       04-29    140  |  105  |  11  ----------------------------<  123<H>  3.7   |  22  |  0.43<L>    Ca    9.2      29 Apr 2020 09:53  Phos  3.3     04-29  Mg     2.2     04-29    TPro  7.6  /  Alb  3.6  /  TBili  0.9  /  DBili  x   /  AST  31  /  ALT  40<H>  /  AlkPhos  63  04-29                  Ordinal Scale: score : 5  1) Death  2) Hospitalized, on invasive mechanical ventilation or ECMO  3) Hospitalized, on non-invasive ventilation or high flow oxygen devices  4) Hospitalized, requiring low flow(<11l/min) supplemental oxygen  5) Hospitalized, not requiring supplemental oxygen - requiring ongoing medical care(COVID-19 related or otherwise)  6) Hospitalized, not requiring supplemental oxygen - no longer requires ongoing medical care(other than per protocol RDV administration)  7) Not hospitalized        < from: Xray Chest 1 View AP/PA (04.27.20 @ 14:51) >  Impression:  1.  Right-sided peripheral opacities consistent with covid 19 infection.      DISCRETE X-RAY DATA:  Percent of LEFT lung opacification: 1-33%  Percent of RIGHT lung opacification: 34-66%  Change in lung opacification from most recent x-ray (<=3 days): No Prior  Change from prior dated 3 or more days (same admission): No Prior              < end of copied text >

## 2020-04-29 NOTE — PROGRESS NOTE ADULT - SUBJECTIVE AND OBJECTIVE BOX
Medicine Progress Note    Patient is a 60y old  Female who presents with a chief complaint of PNA, r/o COVID (29 Apr 2020 13:45)      SUBJECTIVE / OVERNIGHT EVENTS: still feels short of breath    ADDITIONAL REVIEW OF SYSTEMS:    MEDICATIONS  (STANDING):  enoxaparin Injectable 40 milliGRAM(s) SubCutaneous daily  remdesivir IVPB ([id number]) 100 milliGRAM(s) IV Intermittent daily    MEDICATIONS  (PRN):  acetaminophen   Tablet .. 650 milliGRAM(s) Oral every 6 hours PRN Temp greater or equal to 38C (100.4F)  ALBUTerol    90 MICROgram(s) HFA Inhaler 2 Puff(s) Inhalation every 6 hours PRN Shortness of Breath  diphenhydrAMINE   Injectable 25 milliGRAM(s) IV Push once PRN anaphylaxis during remdesivir infusion  EPINEPHrine     1 mG/mL Injectable 0.3 milliGRAM(s) IntraMuscular once PRN anaphylaxis during remdesivir infusion  melatonin 3 milliGRAM(s) Oral at bedtime PRN Sleep    CAPILLARY BLOOD GLUCOSE        I&O's Summary    28 Apr 2020 07:01  -  29 Apr 2020 07:00  --------------------------------------------------------  IN: 848 mL / OUT: 0 mL / NET: 848 mL        PHYSICAL EXAM:  Vital Signs Last 24 Hrs  T(C): 37.1 (29 Apr 2020 13:05), Max: 37.2 (28 Apr 2020 20:40)  T(F): 98.8 (29 Apr 2020 13:05), Max: 99 (29 Apr 2020 04:37)  HR: 88 (29 Apr 2020 13:05) (77 - 89)  BP: 108/60 (29 Apr 2020 13:05) (96/57 - 110/62)  BP(mean): --  RR: 18 (29 Apr 2020 13:05) (18 - 20)  SpO2: 89% (29 Apr 2020 14:14) (89% - 95%)  CONSTITUTIONAL: NAD, well-developed, well-groomed  ENMT: Moist oral mucosa, no pharyngeal injection or exudates; normal dentition  RESPIRATORY: No accessory muscle use, speaking in full sentences, no wheezing noted  CARDIOVASCULAR: Regular rate and rhythm, normal S1 and S2, no murmur/rub/gallop; No lower extremity edema; Peripheral pulses are 2+ bilaterally  PSYCH: A+O to person, place, and time; affect appropriate  NEUROLOGY: CN 2-12 are intact and symmetric; no gross sensory deficits   SKIN: No rashes; no palpable lesions    LABS:                        11.5   4.54  )-----------( 397      ( 28 Apr 2020 05:57 )             34.1     04-29    140  |  105  |  11  ----------------------------<  123<H>  3.7   |  22  |  0.43<L>    Ca    9.2      29 Apr 2020 09:53  Phos  3.3     04-29  Mg     2.2     04-29    TPro  7.6  /  Alb  3.6  /  TBili  0.9  /  DBili  x   /  AST  31  /  ALT  40<H>  /  AlkPhos  63  04-29    PT/INR - ( 28 Apr 2020 06:00 )   PT: 14.4 SEC;   INR: 1.25          PTT - ( 28 Apr 2020 06:00 )  PTT:27.5 SEC          COVID-19 PCR: Detected (27 Apr 2020 14:25)      RADIOLOGY & ADDITIONAL TESTS:  Imaging from Last 24 Hours:    Electrocardiogram/QTc Interval:    COORDINATION OF CARE:  Care Discussed with Consultants/Other Providers:

## 2020-04-29 NOTE — PROGRESS NOTE ADULT - PROBLEM SELECTOR PLAN 1
Pt w/ hypoxia in setting of likely COVID-19 PNA.   Pt remains off O2 w/ SpO2 95% on RA and appears comfortable.   -ambulatory SpO2 89%  -continue on remdesivir trial  - albuterol HFA prn for SOB  - management of PNA as below

## 2020-04-29 NOTE — PROGRESS NOTE ADULT - ASSESSMENT
61 yo woman with  COVID +  Viral pneumonia  Hypoxia,MORRISON  elevated Inflammatory markers    A) Covid  yoko with nette pneumonia and hypoxia due to it  elevated inflammatory markers  Monitor oxygenation  on remdesivir  No investigational agent while on it  Max 10 days though final duration depending on response-can be DCed on discharge  Monitor CBC CMP    B) Pneumonia  Likley viral Monitor for s/s any superimposed bacterial process  Plan as above    C) fever, elevated markers  Plan as above    Will tailor plan for ID issues  per course,results.Will defer to primary team on management of other issues.  Assessment, plan and recommendations as detailed above were discussed with the medical/primary  team-Dr Puckett.  Will Follow.  Beeper [phone] [facility] [id number].   Wknd/afterhours/No response-[phone] or Fellow on call

## 2020-04-30 VITALS
HEART RATE: 75 BPM | TEMPERATURE: 99 F | DIASTOLIC BLOOD PRESSURE: 52 MMHG | RESPIRATION RATE: 19 BRPM | SYSTOLIC BLOOD PRESSURE: 100 MMHG | OXYGEN SATURATION: 95 %

## 2020-04-30 LAB
ALBUMIN SERPL ELPH-MCNC: 3 G/DL — LOW (ref 3.3–5)
ALBUMIN SERPL ELPH-MCNC: 3 G/DL — LOW (ref 3.3–5)
ALP SERPL-CCNC: 61 U/L — SIGNIFICANT CHANGE UP (ref 40–120)
ALP SERPL-CCNC: 61 U/L — SIGNIFICANT CHANGE UP (ref 40–120)
ALT FLD-CCNC: 30 U/L — SIGNIFICANT CHANGE UP (ref 4–33)
ALT FLD-CCNC: 30 U/L — SIGNIFICANT CHANGE UP (ref 4–33)
ANION GAP SERPL CALC-SCNC: 12 MMO/L — SIGNIFICANT CHANGE UP (ref 7–14)
ANION GAP SERPL CALC-SCNC: 12 MMO/L — SIGNIFICANT CHANGE UP (ref 7–14)
APTT BLD: 32 SEC — SIGNIFICANT CHANGE UP (ref 27.5–36.3)
AST SERPL-CCNC: 22 U/L — SIGNIFICANT CHANGE UP (ref 4–32)
AST SERPL-CCNC: 22 U/L — SIGNIFICANT CHANGE UP (ref 4–32)
BILIRUB SERPL-MCNC: 0.7 MG/DL — SIGNIFICANT CHANGE UP (ref 0.2–1.2)
BILIRUB SERPL-MCNC: 0.7 MG/DL — SIGNIFICANT CHANGE UP (ref 0.2–1.2)
BUN SERPL-MCNC: 10 MG/DL — SIGNIFICANT CHANGE UP (ref 7–23)
BUN SERPL-MCNC: 10 MG/DL — SIGNIFICANT CHANGE UP (ref 7–23)
CALCIUM SERPL-MCNC: 8.6 MG/DL — SIGNIFICANT CHANGE UP (ref 8.4–10.5)
CALCIUM SERPL-MCNC: 8.6 MG/DL — SIGNIFICANT CHANGE UP (ref 8.4–10.5)
CHLORIDE SERPL-SCNC: 104 MMOL/L — SIGNIFICANT CHANGE UP (ref 98–107)
CHLORIDE SERPL-SCNC: 104 MMOL/L — SIGNIFICANT CHANGE UP (ref 98–107)
CO2 SERPL-SCNC: 23 MMOL/L — SIGNIFICANT CHANGE UP (ref 22–31)
CO2 SERPL-SCNC: 23 MMOL/L — SIGNIFICANT CHANGE UP (ref 22–31)
CREAT SERPL-MCNC: 0.51 MG/DL — SIGNIFICANT CHANGE UP (ref 0.5–1.3)
CREAT SERPL-MCNC: 0.51 MG/DL — SIGNIFICANT CHANGE UP (ref 0.5–1.3)
GLUCOSE SERPL-MCNC: 98 MG/DL — SIGNIFICANT CHANGE UP (ref 70–99)
GLUCOSE SERPL-MCNC: 98 MG/DL — SIGNIFICANT CHANGE UP (ref 70–99)
HCT VFR BLD CALC: 36.9 % — SIGNIFICANT CHANGE UP (ref 34.5–45)
HGB BLD-MCNC: 12.2 G/DL — SIGNIFICANT CHANGE UP (ref 11.5–15.5)
INR BLD: 1.59 — HIGH (ref 0.88–1.17)
MAGNESIUM SERPL-MCNC: 2 MG/DL — SIGNIFICANT CHANGE UP (ref 1.6–2.6)
MCHC RBC-ENTMCNC: 29 PG — SIGNIFICANT CHANGE UP (ref 27–34)
MCHC RBC-ENTMCNC: 33.1 % — SIGNIFICANT CHANGE UP (ref 32–36)
MCV RBC AUTO: 87.9 FL — SIGNIFICANT CHANGE UP (ref 80–100)
NRBC # FLD: 0 K/UL — SIGNIFICANT CHANGE UP (ref 0–0)
PHOSPHATE SERPL-MCNC: 3.3 MG/DL — SIGNIFICANT CHANGE UP (ref 2.5–4.5)
PLATELET # BLD AUTO: 562 K/UL — HIGH (ref 150–400)
PMV BLD: 9.1 FL — SIGNIFICANT CHANGE UP (ref 7–13)
POTASSIUM SERPL-MCNC: 3.7 MMOL/L — SIGNIFICANT CHANGE UP (ref 3.5–5.3)
POTASSIUM SERPL-MCNC: 3.7 MMOL/L — SIGNIFICANT CHANGE UP (ref 3.5–5.3)
POTASSIUM SERPL-SCNC: 3.7 MMOL/L — SIGNIFICANT CHANGE UP (ref 3.5–5.3)
POTASSIUM SERPL-SCNC: 3.7 MMOL/L — SIGNIFICANT CHANGE UP (ref 3.5–5.3)
PROT SERPL-MCNC: 6.7 G/DL — SIGNIFICANT CHANGE UP (ref 6–8.3)
PROT SERPL-MCNC: 6.7 G/DL — SIGNIFICANT CHANGE UP (ref 6–8.3)
PROTHROM AB SERPL-ACNC: 18.4 SEC — HIGH (ref 9.8–13.1)
RBC # BLD: 4.2 M/UL — SIGNIFICANT CHANGE UP (ref 3.8–5.2)
RBC # FLD: 12.7 % — SIGNIFICANT CHANGE UP (ref 10.3–14.5)
SODIUM SERPL-SCNC: 139 MMOL/L — SIGNIFICANT CHANGE UP (ref 135–145)
SODIUM SERPL-SCNC: 139 MMOL/L — SIGNIFICANT CHANGE UP (ref 135–145)
WBC # BLD: 6.02 K/UL — SIGNIFICANT CHANGE UP (ref 3.8–10.5)
WBC # FLD AUTO: 6.02 K/UL — SIGNIFICANT CHANGE UP (ref 3.8–10.5)

## 2020-04-30 PROCEDURE — 99239 HOSP IP/OBS DSCHRG MGMT >30: CPT

## 2020-04-30 PROCEDURE — 99232 SBSQ HOSP IP/OBS MODERATE 35: CPT

## 2020-04-30 RX ADMIN — REMDESIVIR 500 MILLIGRAM(S): 5 INJECTION INTRAVENOUS at 10:46

## 2020-04-30 NOTE — PROGRESS NOTE ADULT - PROBLEM SELECTOR PLAN 6
Transitions of Care Status:  1.  Name of PCP: Dr. Gabriel Mayorga  2.  PCP Contacted on Admission: [ ] Y    [x] N - admitted overnight   3.  PCP contacted at Discharge: [ ] Y    [ ] N    [ ] N/A  4.  Post-Discharge Appointment Date and Location:  5.  Summary of Handoff given to PCP: Transitions of Care Status:  1.  Name of PCP: Dr. Sheila Alexander  2.  PCP Contacted on Admission: [ ] Y    [x] N    3.  PCP contacted at Discharge: [ ] Y    [ ] N    [ ] N/A  4.  Post-Discharge Appointment Date and Location:  5.  Summary of Handoff given to PCP: Transitions of Care Status:  1.  Name of PCP: Dr. Sheila Alexander  2.  PCP Contacted on Admission: [ ] Y    [x] N    3.  PCP contacted at Discharge: [ x] Y    [ ] N    [ ] N/A  [phone]  4.  Post-Discharge Appointment Date and Location:  5.  Summary of Handoff given to PCP:

## 2020-04-30 NOTE — PROGRESS NOTE ADULT - PROBLEM SELECTOR PLAN 1
Pt w/ hypoxia in setting of likely COVID-19 PNA.   Pt remains off O2 w/ SpO2 95% on RA and appears comfortable.   -ambulatory SpO2 89%  -continue on remdesivir trial  - albuterol HFA prn for SOB  - management of PNA as below Pt w/ hypoxia in setting of COVID-19 PNA - now no longer requiring oxygen, no desat with exertion  -continue on remdesivir trial - d/w Dr. Wilfredo lozano to d/c after dose today  - albuterol HFA prn for SOB

## 2020-04-30 NOTE — PROGRESS NOTE ADULT - ATTENDING COMMENTS
Patient seen and examined  Above verified  Agree with above unless as noted below.  Patient doing well  likely Dc home today  tolerating meds well-labs ok  Will tailor plan for ID issues  per course,results.Will defer to primary team on management of other issues.  Assessment, plan and recommendations as detailed above were discussed with the medical/primary  team-Dr hutchinson.  I will be at [facility] starting tomorrow.  My colleagues in ID service will cover ,follow and assume care of ID issues.  Please call ID fellow on call via [email or web address](or [phone] if unreachable)  if issues.
Spent 35 minutes counseling and coordinating discharge care.

## 2020-04-30 NOTE — PROGRESS NOTE ADULT - ASSESSMENT
61 yo woman with  COVID +  Viral pneumonia  Hypoxia,MORRISON  elevated Inflammatory markers    A) Covid  yoko with nette pneumonia and hypoxia due to it  elevated inflammatory markers  Monitor oxygenation  on remdesivir  No investigational agent while on it  Max 10 days though final duration depending on response-can be DCed on discharge  Monitor CBC CMP    B) Pneumonia  Likley viral Monitor for s/s any superimposed bacterial process  Plan as above    C) fever, elevated markers  Plan as above    Will tailor plan for ID issues  per course,results.Will defer to primary team on management of other issues.  Assessment, plan and recommendations as detailed above were discussed with the medical/primary  team-Dr Puckett.  Will Follow.  Beeper [phone] [facility] [id number].   Wknd/afterhours/No response-[phone] or Fellow on call 59 yo woman with  COVID +  Viral pneumonia  Hypoxia,MORRISON  elevated Inflammatory markers    A) Covid  yoko with nette pneumonia and hypoxia due to it  elevated inflammatory markers  Monitor oxygenation  on remdesivir  No investigational agent while on it  Max 10 days though final duration depending on response-can be DCed on discharge-pt yoko for DC today  Monitor CBC CMP    B) Pneumonia  Yoko viral Monitor for s/s any superimposed bacterial process  Plan as above    C) fever, elevated markers  Plan as above

## 2020-04-30 NOTE — PROGRESS NOTE ADULT - PROBLEM SELECTOR PLAN 2
CXR w/ b/l opacities c/w COVID PNA. Pt w/ known + COVID contacts.  - COVID PCR (+)  - albuterol HFA prn for SOB  -enrolled in remdesivir trial CXR w/ b/l opacities c/w COVID PNA. Pt w/ known + COVID contacts.  - confirmed COVID PCR (+)  - albuterol HFA prn for SOB  - remdesivir trial as noted above

## 2020-04-30 NOTE — PROGRESS NOTE ADULT - SUBJECTIVE AND OBJECTIVE BOX
Patient is a 60y old  Female who presents with a chief complaint of PNA, r/o COVID (30 Apr 2020 09:22)    Being followed by ID for COVID    Interval history:  No acute events, reports feeling much better, cough and SOB have improved since yesterday. No major complaints, feels well. Satting well on room air.    ROS:  No cough,SOB,CP  No N/V/D./abd pain  No other complaints      Antimicrobials:      Other medications reviewed    Vital Signs Last 24 Hrs  T(C): 37.1 (04-30-20 @ 04:50), Max: 37.5 (04-29-20 @ 21:14)  T(F): 98.8 (04-30-20 @ 04:50), Max: 99.5 (04-29-20 @ 21:14)  HR: 77 (04-30-20 @ 04:50) (77 - 89)  BP: 111/55 (04-30-20 @ 04:50) (106/58 - 115/52)  BP(mean): --  RR: 18 (04-30-20 @ 04:50) (18 - 20)  SpO2: 93% (04-30-20 @ 04:50) (89% - 95%)    Physical Exam:        HEENT PERRLA EOMI    No oral exudate or erythema    Chest Good AE,CTA    CVS RRR S1 S2 WNl No murmur or rub or gallop    Abd soft BS normal No tenderness no masses    IV site no erythema tenderness or discharge    CNS AAO X 3 no focal    Lab Data:        04-30    139  |  104  |  10  ----------------------------<  98  3.7   |  23  |  0.51    Ca    8.6      30 Apr 2020 07:08  Phos  3.3     04-30  Mg     2.0     04-30    TPro  6.7  /  Alb  3.0<L>  /  TBili  0.7  /  DBili  x   /  AST  22  /  ALT  30  /  AlkPhos  61  04-30                  Ordinal Scale: score : 5  1) Death  2) Hospitalized, on invasive mechanical ventilation or ECMO  3) Hospitalized, on non-invasive ventilation or high flow oxygen devices  4) Hospitalized, requiring low flow(<11l/min) supplemental oxygen  5) Hospitalized, not requiring supplemental oxygen - requiring ongoing medical care(COVID-19 related or otherwise)  6) Hospitalized, not requiring supplemental oxygen - no longer requires ongoing medical care(other than per protocol RDV administration)  7) Not hospitalized Patient is a 60y old  Female who presents with a chief complaint of PNA, r/o COVID (30 Apr 2020 09:22)    Being followed by ID for COVID    Interval history:  No acute events, reports feeling much better, cough and SOB have improved since yesterday. No major complaints, feels well. Satting well on room air.    ROS:  No cough,SOB,CP  No N/V/D./abd pain  No other complaints      Antimicrobials:      Other medications reviewed  MEDICATIONS  (STANDING):  enoxaparin Injectable 40 milliGRAM(s) SubCutaneous daily  remdesivir IVPB ([id number]) 100 milliGRAM(s) IV Intermittent daily      Vital Signs Last 24 Hrs  T(C): 37.1 (04-30-20 @ 04:50), Max: 37.5 (04-29-20 @ 21:14)  T(F): 98.8 (04-30-20 @ 04:50), Max: 99.5 (04-29-20 @ 21:14)  HR: 77 (04-30-20 @ 04:50) (77 - 89)  BP: 111/55 (04-30-20 @ 04:50) (106/58 - 115/52)  BP(mean): --  RR: 18 (04-30-20 @ 04:50) (18 - 20)  SpO2: 93% (04-30-20 @ 04:50) (89% - 95%)    Physical Exam:        HEENT PERRLA EOMI    No oral exudate or erythema    Chest Good AE,CTA    CVS RRR S1 S2 WNl No murmur or rub or gallop    Abd soft BS normal No tenderness no masses    IV site no erythema tenderness or discharge    CNS AAO X 3 no focal    Lab Data:        04-30    139  |  104  |  10  ----------------------------<  98  3.7   |  23  |  0.51    Ca    8.6      30 Apr 2020 07:08  Phos  3.3     04-30  Mg     2.0     04-30    TPro  6.7  /  Alb  3.0<L>  /  TBili  0.7  /  DBili  x   /  AST  22  /  ALT  30  /  AlkPhos  61  04-30                  Ordinal Scale: score : 5  1) Death  2) Hospitalized, on invasive mechanical ventilation or ECMO  3) Hospitalized, on non-invasive ventilation or high flow oxygen devices  4) Hospitalized, requiring low flow(<11l/min) supplemental oxygen  5) Hospitalized, not requiring supplemental oxygen - requiring ongoing medical care(COVID-19 related or otherwise)  6) Hospitalized, not requiring supplemental oxygen - no longer requires ongoing medical care(other than per protocol RDV administration)  7) Not hospitalized      < from: Xray Chest 1 View AP/PA (04.27.20 @ 14:51) >  Impression:  1.  Right-sided peripheral opacities consistent with covid 19 infection.      DISCRETE X-RAY DATA:  Percent of LEFT lung opacification: 1-33%  Percent of RIGHT lung opacification: 34-66%  Change in lung opacification from most recent x-ray (<=3 days): No Prior  Change from prior dated 3 or more days (same admission): No Prior      < end of copied text > Patient is a 60y old  Female who presents with a chief complaint of PNA, r/o COVID (30 Apr 2020 09:22)    Being followed by ID for COVID    Interval history:  No acute events, reports feeling much better, cough and SOB have improved since yesterday. No major complaints, feels well. Satting well on room air.    ROS:  No cough,SOB,CP  No N/V/D./abd pain  No other complaints      Antimicrobials:      Other medications reviewed  MEDICATIONS  (STANDING):  enoxaparin Injectable 40 milliGRAM(s) SubCutaneous daily  remdesivir IVPB ([id number]) 100 milliGRAM(s) IV Intermittent daily      Vital Signs Last 24 Hrs  T(C): 37.1 (04-30-20 @ 04:50), Max: 37.5 (04-29-20 @ 21:14)  T(F): 98.8 (04-30-20 @ 04:50), Max: 99.5 (04-29-20 @ 21:14)  HR: 77 (04-30-20 @ 04:50) (77 - 89)  BP: 111/55 (04-30-20 @ 04:50) (106/58 - 115/52)  BP(mean): --  RR: 18 (04-30-20 @ 04:50) (18 - 20)  SpO2: 93% (04-30-20 @ 04:50) (89% - 95%)    Physical Exam:        HEENT PERRLA EOMI    No oral exudate or erythema    Chest Good AE,CTA    CVS RRR S1 S2 WNl No murmur or rub or gallop    Abd soft BS normal No tenderness no masses    IV site no erythema tenderness or discharge    CNS AAO X 3 no focal    Lab Data:    CBC pending-was not drawn in morning-being ordered now    04-30    139  |  104  |  10  ----------------------------<  98  3.7   |  23  |  0.51    Ca    8.6      30 Apr 2020 07:08  Phos  3.3     04-30  Mg     2.0     04-30    TPro  6.7  /  Alb  3.0<L>  /  TBili  0.7  /  DBili  x   /  AST  22  /  ALT  30  /  AlkPhos  61  04-30                  Ordinal Scale: score : 5  1) Death  2) Hospitalized, on invasive mechanical ventilation or ECMO  3) Hospitalized, on non-invasive ventilation or high flow oxygen devices  4) Hospitalized, requiring low flow(<11l/min) supplemental oxygen  5) Hospitalized, not requiring supplemental oxygen - requiring ongoing medical care(COVID-19 related or otherwise)  6) Hospitalized, not requiring supplemental oxygen - no longer requires ongoing medical care(other than per protocol RDV administration)  7) Not hospitalized      < from: Xray Chest 1 View AP/PA (04.27.20 @ 14:51) >  Impression:  1.  Right-sided peripheral opacities consistent with covid 19 infection.      DISCRETE X-RAY DATA:  Percent of LEFT lung opacification: 1-33%  Percent of RIGHT lung opacification: 34-66%  Change in lung opacification from most recent x-ray (<=3 days): No Prior  Change from prior dated 3 or more days (same admission): No Prior      < end of copied text >

## 2020-04-30 NOTE — PROGRESS NOTE ADULT - SUBJECTIVE AND OBJECTIVE BOX
[facility] Division of Hospital Medicine  Angie Sepulveda MD  Pager (M-F, 8A-5P):  In-house pager [phone]; Long-range pager [phone]  Other Times:  Please page Hospitalist in Charge -  In-house pager [phone]    Patient is a 60y old  Female who presents with a chief complaint of PNA, r/o COVID (29 Apr 2020 14:47)      SUBJECTIVE / OVERNIGHT EVENTS: ...  ADDITIONAL REVIEW OF SYSTEMS:    MEDICATIONS  (STANDING):  enoxaparin Injectable 40 milliGRAM(s) SubCutaneous daily  remdesivir IVPB ([id number]) 100 milliGRAM(s) IV Intermittent daily    MEDICATIONS  (PRN):  acetaminophen   Tablet .. 650 milliGRAM(s) Oral every 6 hours PRN Temp greater or equal to 38C (100.4F)  ALBUTerol    90 MICROgram(s) HFA Inhaler 2 Puff(s) Inhalation every 6 hours PRN Shortness of Breath  diphenhydrAMINE   Injectable 25 milliGRAM(s) IV Push once PRN anaphylaxis during remdesivir infusion  EPINEPHrine     1 mG/mL Injectable 0.3 milliGRAM(s) IntraMuscular once PRN anaphylaxis during remdesivir infusion  melatonin 3 milliGRAM(s) Oral at bedtime PRN Sleep      CAPILLARY BLOOD GLUCOSE      POCT Blood Glucose.: 111 mg/dL (29 Apr 2020 17:02)    I&O's Summary      PHYSICAL EXAM:  Vital Signs Last 24 Hrs  T(C): 37.1 (30 Apr 2020 04:50), Max: 37.5 (29 Apr 2020 21:14)  T(F): 98.8 (30 Apr 2020 04:50), Max: 99.5 (29 Apr 2020 21:14)  HR: 77 (30 Apr 2020 04:50) (77 - 89)  BP: 111/55 (30 Apr 2020 04:50) (106/58 - 115/52)  BP(mean): --  RR: 18 (30 Apr 2020 04:50) (18 - 20)  SpO2: 93% (30 Apr 2020 04:50) (89% - 95%)    CONSTITUTIONAL: NAD, well-developed, well-groomed  ENMT: Moist oral mucosa, no pharyngeal injection or exudates; normal dentition  RESPIRATORY: No accessory muscle use, speaking in full sentences, no wheezing noted  CARDIOVASCULAR: Regular rate and rhythm, normal S1 and S2, no murmur/rub/gallop; No lower extremity edema; Peripheral pulses are 2+ bilaterally  PSYCH: A+O to person, place, and time; affect appropriate  NEUROLOGY: CN 2-12 are intact and symmetric; no gross sensory deficits   SKIN: No rashes; no palpable lesions    LABS:    04-30    139  |  104  |  10  ----------------------------<  98  3.7   |  23  |  0.51    Ca    8.6      30 Apr 2020 07:08  Phos  3.3     04-30  Mg     2.0     04-30    TPro  6.7  /  Alb  3.0<L>  /  TBili  0.7  /  DBili  x   /  AST  22  /  ALT  30  /  AlkPhos  61  04-30    PT/INR - ( 30 Apr 2020 07:08 )   PT: 18.4 SEC;   INR: 1.59          PTT - ( 30 Apr 2020 07:08 )  PTT:32.0 SEC            RADIOLOGY & ADDITIONAL TESTS:  Results Reviewed:   Imaging Personally Reviewed:  Electrocardiogram Personally Reviewed:    COORDINATION OF CARE:  Care Discussed with Consultants/Other Providers [Y/N]: RN, SW, CM, ACP re overall care   Prior or Outpatient Records Reviewed [Y/N]: [facility] Division of Hospital Medicine  Angie Sepulveda MD  Pager (M-F, 8A-5P):  In-house pager [phone]; Long-range pager [phone]  Other Times:  Please page Hospitalist in Charge -  In-house pager [phone]    Patient is a 60y old  Female who presents with a chief complaint of PNA, r/o COVID (29 Apr 2020 14:47)      SUBJECTIVE / OVERNIGHT EVENTS: Pt seen earlier, doing much better.  Breathing significantly improved, even when walking.  No pain.  Eating/drinking normally.  Still little weak.  ADDITIONAL REVIEW OF SYSTEMS:    MEDICATIONS  (STANDING):  enoxaparin Injectable 40 milliGRAM(s) SubCutaneous daily  remdesivir IVPB ([id number]) 100 milliGRAM(s) IV Intermittent daily    MEDICATIONS  (PRN):  acetaminophen   Tablet .. 650 milliGRAM(s) Oral every 6 hours PRN Temp greater or equal to 38C (100.4F)  ALBUTerol    90 MICROgram(s) HFA Inhaler 2 Puff(s) Inhalation every 6 hours PRN Shortness of Breath  diphenhydrAMINE   Injectable 25 milliGRAM(s) IV Push once PRN anaphylaxis during remdesivir infusion  EPINEPHrine     1 mG/mL Injectable 0.3 milliGRAM(s) IntraMuscular once PRN anaphylaxis during remdesivir infusion  melatonin 3 milliGRAM(s) Oral at bedtime PRN Sleep      CAPILLARY BLOOD GLUCOSE      POCT Blood Glucose.: 111 mg/dL (29 Apr 2020 17:02)    I&O's Summary      PHYSICAL EXAM:  Vital Signs Last 24 Hrs  T(C): 37.1 (30 Apr 2020 04:50), Max: 37.5 (29 Apr 2020 21:14)  T(F): 98.8 (30 Apr 2020 04:50), Max: 99.5 (29 Apr 2020 21:14)  HR: 77 (30 Apr 2020 04:50) (77 - 89)  BP: 111/55 (30 Apr 2020 04:50) (106/58 - 115/52)  BP(mean): --  RR: 18 (30 Apr 2020 04:50) (18 - 20)  SpO2: 93% (30 Apr 2020 04:50) (89% - 95%)    CONSTITUTIONAL: NAD, well-developed, well-groomed  ENMT: Moist oral mucosa, no pharyngeal injection or exudates; normal dentition  RESPIRATORY: clear  CARDIOVASCULAR: Regular rate and rhythm, normal S1 and S2, no murmur/rub/gallop; No lower extremity edema; Peripheral pulses are 2+ bilaterally  PSYCH: A+O to person, place, and time; affect appropriate  NEUROLOGY: CN 2-12 are intact and symmetric; no gross sensory deficits   SKIN: No rashes; no palpable lesions    LABS:    04-30    139  |  104  |  10  ----------------------------<  98  3.7   |  23  |  0.51    Ca    8.6      30 Apr 2020 07:08  Phos  3.3     04-30  Mg     2.0     04-30    TPro  6.7  /  Alb  3.0<L>  /  TBili  0.7  /  DBili  x   /  AST  22  /  ALT  30  /  AlkPhos  61  04-30    PT/INR - ( 30 Apr 2020 07:08 )   PT: 18.4 SEC;   INR: 1.59          PTT - ( 30 Apr 2020 07:08 )  PTT:32.0 SEC            RADIOLOGY & ADDITIONAL TESTS:  Results Reviewed:   Imaging Personally Reviewed:  Electrocardiogram Personally Reviewed:    COORDINATION OF CARE:  Care Discussed with Consultants/Other Providers [Y/N]: RN, SW, CM, ACP re overall care   Prior or Outpatient Records Reviewed [Y/N]:

## 2020-04-30 NOTE — PROGRESS NOTE ADULT - PROBLEM SELECTOR PLAN 4
Mild transaminitis in setting of COVID-19 infection. Mild transaminitis in setting of COVID-19 infection - resolved

## 2020-04-30 NOTE — PROGRESS NOTE ADULT - ASSESSMENT
Pt is a 59 yo F w/ no known PMHx presents with SOB and fever in setting of known COVID contacts. In ED, pt found to desaturate with ambulation. CXR shows b/l PNA consistent with covid-19 infection. 59 yo F w/ no known PMHx presents with SOB and fever in setting of known COVID contacts. In ED, pt found to desaturate with ambulation. CXR shows b/l PNA, found to be COVID+

## 2020-05-06 PROBLEM — Z78.9 OTHER SPECIFIED HEALTH STATUS: Chronic | Status: ACTIVE | Noted: 2020-04-27

## 2020-05-07 PROBLEM — Z00.00 ENCOUNTER FOR PREVENTIVE HEALTH EXAMINATION: Status: ACTIVE | Noted: 2020-05-07

## 2020-05-11 ENCOUNTER — APPOINTMENT (OUTPATIENT)
Dept: PULMONOLOGY | Facility: CLINIC | Age: 60
End: 2020-05-11
Payer: COMMERCIAL

## 2020-05-11 DIAGNOSIS — Z78.9 OTHER SPECIFIED HEALTH STATUS: ICD-10-CM

## 2020-05-11 DIAGNOSIS — Z71.89 OTHER SPECIFIED COUNSELING: ICD-10-CM

## 2020-05-11 PROCEDURE — 99203 OFFICE O/P NEW LOW 30 MIN: CPT | Mod: 95

## 2020-05-11 NOTE — DISCUSSION/SUMMARY
[FreeTextEntry1] : The patient was advised to take [employer], vitamin C and Vitamin D.\par Will see her in the office in 4 weeks.

## 2020-05-11 NOTE — HISTORY OF PRESENT ILLNESS
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  She was treated with Remdesevir and improved dramatically.  She currently feels fine and has no Sx at all.is a non smoker. She has no significant past medical history.\par She had a CXR which revealed bilateral infiltrations.\par She now exercises . Her oxygen saturation is 92-95%. Her BP today is 100/60.  Temp is 97.6, Heart rate is 76. [Home] : at home, [unfilled] , at the time of the visit.

## 2020-06-16 ENCOUNTER — APPOINTMENT (OUTPATIENT)
Dept: PULMONOLOGY | Facility: CLINIC | Age: 60
End: 2020-06-16
Payer: COMMERCIAL

## 2020-06-16 VITALS
SYSTOLIC BLOOD PRESSURE: 99 MMHG | WEIGHT: 146.71 LBS | DIASTOLIC BLOOD PRESSURE: 61 MMHG | TEMPERATURE: 99.1 F | OXYGEN SATURATION: 97 % | HEART RATE: 73 BPM | HEIGHT: 64.57 IN | BODY MASS INDEX: 24.74 KG/M2

## 2020-06-16 DIAGNOSIS — U07.1 COVID-19: ICD-10-CM

## 2020-06-16 DIAGNOSIS — R06.00 DYSPNEA, UNSPECIFIED: ICD-10-CM

## 2020-06-16 PROCEDURE — 99213 OFFICE O/P EST LOW 20 MIN: CPT

## 2020-06-16 NOTE — PHYSICAL EXAM
[No Acute Distress] : no acute distress [Normal Appearance] : normal appearance [Normal Oropharynx] : normal oropharynx [No Neck Mass] : no neck mass [Normal Rate/Rhythm] : normal rate/rhythm [No Murmurs] : no murmurs [Normal S1, S2] : normal s1, s2 [No Abnormalities] : no abnormalities [No Resp Distress] : no resp distress [Clear to Auscultation Bilaterally] : clear to auscultation bilaterally [Normal Gait] : normal gait [Benign] : benign [No Cyanosis] : no cyanosis [No Clubbing] : no clubbing [No Edema] : no edema [FROM] : FROM [Normal Color/ Pigmentation] : normal color/ pigmentation [Oriented x3] : oriented x3 [No Focal Deficits] : no focal deficits [Normal Affect] : normal affect

## 2020-06-16 NOTE — HISTORY OF PRESENT ILLNESS
[TextBox_4] : The patient is here post COVID pneumonia, successfully treated with Remdesivir.\par She is currently feeling well. She has minimal dyspnea. No cough.\par She is a non smoker. She wants to be checked for pneumonia and asthma.

## 2020-06-16 NOTE — DISCUSSION/SUMMARY
[FreeTextEntry1] : The patient is doing well. Will do a follow chest x ray.\par Will check for COVID antibodies and do PFT.

## 2020-06-18 ENCOUNTER — APPOINTMENT (OUTPATIENT)
Dept: PULMONOLOGY | Facility: CLINIC | Age: 60
End: 2020-06-18
Payer: COMMERCIAL

## 2020-06-18 VITALS — TEMPERATURE: 99.3 F

## 2020-06-18 LAB
SARS-COV-2 IGG SERPL IA-ACNC: 201 AU/ML
SARS-COV-2 IGG SERPL QL IA: POSITIVE
SARS-COV-2 N GENE NPH QL NAA+PROBE: NOT DETECTED

## 2020-06-18 PROCEDURE — 94729 DIFFUSING CAPACITY: CPT

## 2020-06-18 PROCEDURE — 94727 GAS DIL/WSHOT DETER LNG VOL: CPT

## 2020-06-18 PROCEDURE — 94010 BREATHING CAPACITY TEST: CPT

## 2020-09-08 ENCOUNTER — APPOINTMENT (OUTPATIENT)
Dept: PULMONOLOGY | Facility: CLINIC | Age: 60
End: 2020-09-08

## 2024-05-22 ENCOUNTER — APPOINTMENT (OUTPATIENT)
Dept: OBGYN | Facility: CLINIC | Age: 64
End: 2024-05-22
Payer: COMMERCIAL

## 2024-05-22 VITALS
DIASTOLIC BLOOD PRESSURE: 71 MMHG | HEART RATE: 83 BPM | OXYGEN SATURATION: 100 % | BODY MASS INDEX: 26.81 KG/M2 | WEIGHT: 159 LBS | SYSTOLIC BLOOD PRESSURE: 118 MMHG

## 2024-05-22 DIAGNOSIS — Z01.419 ENCOUNTER FOR GYNECOLOGICAL EXAMINATION (GENERAL) (ROUTINE) W/OUT ABNORMAL FINDINGS: ICD-10-CM

## 2024-05-22 PROCEDURE — 99386 PREV VISIT NEW AGE 40-64: CPT

## 2024-05-22 NOTE — HISTORY OF PRESENT ILLNESS
Awake
[FreeTextEntry1] : Patient is a 64 year old female who presents for her annual exam. Reports no postmenopausal bleeding, no abdominal or pelvic pain, change in discharge, change in bowel or bladder habits or any other concerns.  Due for pap and mammo.

## 2024-05-22 NOTE — PLAN
[FreeTextEntry1] : Pap obtained Self breast exams, safe sex, diet and exercise discussed Mammograms annually RTO prn and annually for exams

## 2024-05-22 NOTE — COUNSELING
[Nutrition/ Exercise/ Weight Management] : nutrition, exercise, weight management [Body Image] : body image [Vitamins/Supplements] : vitamins/supplements [Breast Self Exam] : breast self exam [Bladder Hygiene] : bladder hygiene [Confidentiality] : confidentiality [STD (testing, results, tx)] : STD (testing, results, tx)

## 2024-05-27 LAB
C TRACH RRNA SPEC QL NAA+PROBE: NOT DETECTED
CYTOLOGY CVX/VAG DOC THIN PREP: NORMAL
N GONORRHOEA RRNA SPEC QL NAA+PROBE: NOT DETECTED
SOURCE TP AMPLIFICATION: NORMAL

## 2024-06-21 NOTE — ED ADULT NURSE NOTE - ISOLATION INDICATION [place] CONTACT
[id number] [phone]    Schedule an appointment as soon as possible for a visit   As needed if symptoms fail to improve.    [facility]  [address] [address] [phone]    As needed, If symptoms worsen      DISCHARGE MEDICATIONS:  New Prescriptions    No medications on file     Controlled Substances Monitoring:          No data to display                (Please note that portions of this note were completed with a voice recognition program.  Efforts were made to edit the dictations but occasionally words are mis-transcribed.)    GREGORY Gutierrez, APRN - CNP (electronically signed)  Attending Emergency Physician           JERAD Gutierrez, APRN - CNP  06/21/24 [id number]     Other Specify